# Patient Record
Sex: MALE | Race: WHITE | NOT HISPANIC OR LATINO | Employment: FULL TIME | URBAN - METROPOLITAN AREA
[De-identification: names, ages, dates, MRNs, and addresses within clinical notes are randomized per-mention and may not be internally consistent; named-entity substitution may affect disease eponyms.]

---

## 2017-01-20 ENCOUNTER — LAB VISIT (OUTPATIENT)
Dept: LAB | Facility: HOSPITAL | Age: 57
End: 2017-01-20
Attending: PODIATRIST
Payer: COMMERCIAL

## 2017-01-20 DIAGNOSIS — G47.33 OBSTRUCTIVE SLEEP APNEA (ADULT) (PEDIATRIC): ICD-10-CM

## 2017-01-20 DIAGNOSIS — E78.2 MIXED HYPERLIPIDEMIA: ICD-10-CM

## 2017-01-20 DIAGNOSIS — M53.9 OTHER UNSPECIFIED BACK DISORDER: ICD-10-CM

## 2017-01-20 DIAGNOSIS — B20 HUMAN IMMUNODEFICIENCY VIRUS (HIV) DISEASE: ICD-10-CM

## 2017-01-20 DIAGNOSIS — K76.89 OTHER CHRONIC NONALCOHOLIC LIVER DISEASE: ICD-10-CM

## 2017-01-20 LAB
ALBUMIN SERPL BCP-MCNC: 3.9 G/DL
ALP SERPL-CCNC: 67 U/L
ALT SERPL W/O P-5'-P-CCNC: 63 U/L
ANION GAP SERPL CALC-SCNC: 9 MMOL/L
AST SERPL-CCNC: 45 U/L
BASOPHILS # BLD AUTO: 0.03 K/UL
BASOPHILS NFR BLD: 0.5 %
BILIRUB SERPL-MCNC: 1 MG/DL
BUN SERPL-MCNC: 13 MG/DL
CALCIUM SERPL-MCNC: 9.4 MG/DL
CHLORIDE SERPL-SCNC: 106 MMOL/L
CHOLEST/HDLC SERPL: 3.2 {RATIO}
CO2 SERPL-SCNC: 27 MMOL/L
CREAT SERPL-MCNC: 1.2 MG/DL
DIFFERENTIAL METHOD: ABNORMAL
EOSINOPHIL # BLD AUTO: 0.2 K/UL
EOSINOPHIL NFR BLD: 2.6 %
ERYTHROCYTE [DISTWIDTH] IN BLOOD BY AUTOMATED COUNT: 14 %
EST. GFR  (AFRICAN AMERICAN): >60 ML/MIN/1.73 M^2
EST. GFR  (NON AFRICAN AMERICAN): >60 ML/MIN/1.73 M^2
GLUCOSE SERPL-MCNC: 112 MG/DL
HCT VFR BLD AUTO: 45.2 %
HDL/CHOLESTEROL RATIO: 30.9 %
HDLC SERPL-MCNC: 194 MG/DL
HDLC SERPL-MCNC: 60 MG/DL
HGB BLD-MCNC: 15.5 G/DL
LDLC SERPL CALC-MCNC: 106.4 MG/DL
LYMPHOCYTES # BLD AUTO: 2.3 K/UL
LYMPHOCYTES NFR BLD: 37.8 %
MCH RBC QN AUTO: 33.5 PG
MCHC RBC AUTO-ENTMCNC: 34.3 %
MCV RBC AUTO: 98 FL
MONOCYTES # BLD AUTO: 0.7 K/UL
MONOCYTES NFR BLD: 11.5 %
NEUTROPHILS # BLD AUTO: 2.9 K/UL
NEUTROPHILS NFR BLD: 47.3 %
NONHDLC SERPL-MCNC: 134 MG/DL
PLATELET # BLD AUTO: 258 K/UL
PMV BLD AUTO: 9.2 FL
POTASSIUM SERPL-SCNC: 4.4 MMOL/L
PROT SERPL-MCNC: 7.7 G/DL
RBC # BLD AUTO: 4.62 M/UL
SODIUM SERPL-SCNC: 142 MMOL/L
TRIGL SERPL-MCNC: 138 MG/DL
WBC # BLD AUTO: 6.11 K/UL

## 2017-01-20 PROCEDURE — 80053 COMPREHEN METABOLIC PANEL: CPT

## 2017-01-20 PROCEDURE — 36415 COLL VENOUS BLD VENIPUNCTURE: CPT | Mod: PO

## 2017-01-20 PROCEDURE — 87536 HIV-1 QUANT&REVRSE TRNSCRPJ: CPT

## 2017-01-20 PROCEDURE — 80061 LIPID PANEL: CPT

## 2017-01-20 PROCEDURE — 86592 SYPHILIS TEST NON-TREP QUAL: CPT

## 2017-01-20 PROCEDURE — 85025 COMPLETE CBC W/AUTO DIFF WBC: CPT

## 2017-01-21 LAB — RPR SER QL: NORMAL

## 2017-01-23 LAB
HIV UQ DATE RECEIVED: ABNORMAL
HIV UQ DATE REPORTED: ABNORMAL
HIV1 RNA # SERPL NAA+PROBE: <40 COPIES/ML
HIV1 RNA SERPL NAA+PROBE-LOG#: <1.6 LOG (10) COPIES/ML
HIV1 RNA SERPL QL NAA+PROBE: DETECTED

## 2017-08-07 DIAGNOSIS — K76.89 OTHER CHRONIC NONALCOHOLIC LIVER DISEASE: Primary | ICD-10-CM

## 2017-08-12 ENCOUNTER — HOSPITAL ENCOUNTER (OUTPATIENT)
Dept: RADIOLOGY | Facility: OTHER | Age: 57
Discharge: HOME | End: 2017-08-12
Attending: INTERNAL MEDICINE
Payer: COMMERCIAL

## 2017-08-12 DIAGNOSIS — K76.89 OTHER CHRONIC NONALCOHOLIC LIVER DISEASE: ICD-10-CM

## 2017-08-12 PROCEDURE — 76700 US EXAM ABDOM COMPLETE: CPT | Mod: TC

## 2017-08-12 PROCEDURE — 76700 US EXAM ABDOM COMPLETE: CPT | Mod: 26,,, | Performed by: RADIOLOGY

## 2017-09-01 ENCOUNTER — LAB VISIT (OUTPATIENT)
Dept: LAB | Facility: HOSPITAL | Age: 57
End: 2017-09-01
Attending: INTERNAL MEDICINE
Payer: COMMERCIAL

## 2017-09-01 DIAGNOSIS — B20 HUMAN IMMUNODEFICIENCY VIRUS (HIV) DISEASE: ICD-10-CM

## 2017-09-01 DIAGNOSIS — E78.2 MIXED HYPERLIPIDEMIA: ICD-10-CM

## 2017-09-01 DIAGNOSIS — G47.33 OBSTRUCTIVE SLEEP APNEA (ADULT) (PEDIATRIC): ICD-10-CM

## 2017-09-01 DIAGNOSIS — Z79.899 ENCOUNTER FOR LONG-TERM (CURRENT) USE OF OTHER MEDICATIONS: ICD-10-CM

## 2017-09-01 DIAGNOSIS — K76.89 OTHER CHRONIC NONALCOHOLIC LIVER DISEASE: ICD-10-CM

## 2017-09-01 DIAGNOSIS — B20 HUMAN IMMUNODEFICIENCY VIRUS (HIV) DISEASE: Primary | ICD-10-CM

## 2017-09-01 LAB
ALBUMIN SERPL BCP-MCNC: 3.8 G/DL
ALP SERPL-CCNC: 68 U/L
ALT SERPL W/O P-5'-P-CCNC: 63 U/L
ANION GAP SERPL CALC-SCNC: 8 MMOL/L
AST SERPL-CCNC: 41 U/L
BILIRUB SERPL-MCNC: 0.6 MG/DL
BUN SERPL-MCNC: 10 MG/DL
CALCIUM SERPL-MCNC: 9.7 MG/DL
CHLORIDE SERPL-SCNC: 106 MMOL/L
CO2 SERPL-SCNC: 28 MMOL/L
CREAT SERPL-MCNC: 1.2 MG/DL
EST. GFR  (AFRICAN AMERICAN): >60 ML/MIN/1.73 M^2
EST. GFR  (NON AFRICAN AMERICAN): >60 ML/MIN/1.73 M^2
ESTIMATED AVG GLUCOSE: 128 MG/DL
GLUCOSE SERPL-MCNC: 111 MG/DL
HBA1C MFR BLD HPLC: 6.1 %
POTASSIUM SERPL-SCNC: 4.5 MMOL/L
PROT SERPL-MCNC: 7.8 G/DL
SODIUM SERPL-SCNC: 142 MMOL/L

## 2017-09-01 PROCEDURE — 83036 HEMOGLOBIN GLYCOSYLATED A1C: CPT

## 2017-09-01 PROCEDURE — 36415 COLL VENOUS BLD VENIPUNCTURE: CPT | Mod: PO

## 2017-09-01 PROCEDURE — 80053 COMPREHEN METABOLIC PANEL: CPT

## 2018-01-05 DIAGNOSIS — G47.33 OBSTRUCTIVE SLEEP APNEA SYNDROME: ICD-10-CM

## 2018-01-05 DIAGNOSIS — B20 HUMAN IMMUNODEFICIENCY VIRUS I INFECTION: Primary | ICD-10-CM

## 2018-01-05 DIAGNOSIS — E78.2 MIXED HYPERLIPIDEMIA: ICD-10-CM

## 2018-01-05 DIAGNOSIS — Z79.899 NEED FOR PROPHYLACTIC CHEMOTHERAPY: ICD-10-CM

## 2018-03-02 ENCOUNTER — LAB VISIT (OUTPATIENT)
Dept: LAB | Facility: HOSPITAL | Age: 58
End: 2018-03-02
Attending: INTERNAL MEDICINE
Payer: COMMERCIAL

## 2018-03-02 DIAGNOSIS — Z79.899 NEED FOR PROPHYLACTIC CHEMOTHERAPY: ICD-10-CM

## 2018-03-02 DIAGNOSIS — G47.33 OBSTRUCTIVE SLEEP APNEA (ADULT) (PEDIATRIC): ICD-10-CM

## 2018-03-02 DIAGNOSIS — B20 HUMAN IMMUNODEFICIENCY VIRUS (HIV) DISEASE: ICD-10-CM

## 2018-03-02 DIAGNOSIS — B20 HUMAN IMMUNODEFICIENCY VIRUS (HIV) DISEASE: Primary | ICD-10-CM

## 2018-03-02 DIAGNOSIS — E78.2 MIXED HYPERLIPIDEMIA: ICD-10-CM

## 2018-03-02 DIAGNOSIS — M53.9 DORSOPATHY: ICD-10-CM

## 2018-03-02 LAB
ALBUMIN SERPL BCP-MCNC: 4.3 G/DL
ALP SERPL-CCNC: 58 U/L
ALT SERPL W/O P-5'-P-CCNC: 54 U/L
ANION GAP SERPL CALC-SCNC: 7 MMOL/L
AST SERPL-CCNC: 36 U/L
BILIRUB SERPL-MCNC: 1.2 MG/DL
BUN SERPL-MCNC: 13 MG/DL
CALCIUM SERPL-MCNC: 10.1 MG/DL
CHLORIDE SERPL-SCNC: 105 MMOL/L
CO2 SERPL-SCNC: 27 MMOL/L
CREAT SERPL-MCNC: 1.2 MG/DL
EST. GFR  (AFRICAN AMERICAN): >60 ML/MIN/1.73 M^2
EST. GFR  (NON AFRICAN AMERICAN): >60 ML/MIN/1.73 M^2
GLUCOSE SERPL-MCNC: 107 MG/DL
POTASSIUM SERPL-SCNC: 4.5 MMOL/L
PROT SERPL-MCNC: 8 G/DL
SODIUM SERPL-SCNC: 139 MMOL/L

## 2018-03-02 PROCEDURE — 36415 COLL VENOUS BLD VENIPUNCTURE: CPT | Mod: PO

## 2018-03-02 PROCEDURE — 80053 COMPREHEN METABOLIC PANEL: CPT

## 2018-04-06 ENCOUNTER — LAB VISIT (OUTPATIENT)
Dept: LAB | Facility: HOSPITAL | Age: 58
End: 2018-04-06
Attending: INTERNAL MEDICINE
Payer: COMMERCIAL

## 2018-04-06 DIAGNOSIS — M53.9 DORSOPATHY: ICD-10-CM

## 2018-04-06 DIAGNOSIS — Z79.899 NEED FOR PROPHYLACTIC CHEMOTHERAPY: ICD-10-CM

## 2018-04-06 DIAGNOSIS — B20 HUMAN IMMUNODEFICIENCY VIRUS (HIV) DISEASE: ICD-10-CM

## 2018-04-06 DIAGNOSIS — E78.2 MIXED HYPERLIPIDEMIA: ICD-10-CM

## 2018-04-06 DIAGNOSIS — E78.2 MIXED HYPERLIPIDEMIA: Primary | ICD-10-CM

## 2018-04-06 LAB
ALBUMIN SERPL BCP-MCNC: 4.1 G/DL
ALP SERPL-CCNC: 71 U/L
ALT SERPL W/O P-5'-P-CCNC: 47 U/L
ANION GAP SERPL CALC-SCNC: 7 MMOL/L
AST SERPL-CCNC: 34 U/L
BILIRUB SERPL-MCNC: 1 MG/DL
BUN SERPL-MCNC: 14 MG/DL
CALCIUM SERPL-MCNC: 9.9 MG/DL
CHLORIDE SERPL-SCNC: 105 MMOL/L
CO2 SERPL-SCNC: 28 MMOL/L
CREAT SERPL-MCNC: 1.2 MG/DL
EST. GFR  (AFRICAN AMERICAN): >60 ML/MIN/1.73 M^2
EST. GFR  (NON AFRICAN AMERICAN): >60 ML/MIN/1.73 M^2
GLUCOSE SERPL-MCNC: 121 MG/DL
POTASSIUM SERPL-SCNC: 4.4 MMOL/L
PROT SERPL-MCNC: 7.7 G/DL
SODIUM SERPL-SCNC: 140 MMOL/L

## 2018-04-06 PROCEDURE — 80053 COMPREHEN METABOLIC PANEL: CPT

## 2018-04-06 PROCEDURE — 36415 COLL VENOUS BLD VENIPUNCTURE: CPT | Mod: PO

## 2018-05-11 DIAGNOSIS — B20 HUMAN IMMUNODEFICIENCY VIRUS (HIV) DISEASE: Primary | ICD-10-CM

## 2018-05-11 DIAGNOSIS — M53.9 DORSOPATHY: ICD-10-CM

## 2018-05-11 DIAGNOSIS — E78.2 MIXED HYPERLIPIDEMIA: ICD-10-CM

## 2018-05-11 DIAGNOSIS — Z79.899 NEED FOR PROPHYLACTIC CHEMOTHERAPY: ICD-10-CM

## 2018-06-08 ENCOUNTER — LAB VISIT (OUTPATIENT)
Dept: LAB | Facility: HOSPITAL | Age: 58
End: 2018-06-08
Attending: INTERNAL MEDICINE
Payer: COMMERCIAL

## 2018-06-08 DIAGNOSIS — Z79.899 NEED FOR PROPHYLACTIC CHEMOTHERAPY: ICD-10-CM

## 2018-06-08 DIAGNOSIS — M53.9 DORSOPATHY: ICD-10-CM

## 2018-06-08 DIAGNOSIS — B20 HUMAN IMMUNODEFICIENCY VIRUS (HIV) DISEASE: ICD-10-CM

## 2018-06-08 DIAGNOSIS — G47.33 OBSTRUCTIVE SLEEP APNEA (ADULT) (PEDIATRIC): ICD-10-CM

## 2018-06-08 DIAGNOSIS — E78.2 MIXED HYPERLIPIDEMIA: ICD-10-CM

## 2018-06-08 LAB
ALBUMIN SERPL BCP-MCNC: 4 G/DL
ALP SERPL-CCNC: 69 U/L
ALT SERPL W/O P-5'-P-CCNC: 41 U/L
ANION GAP SERPL CALC-SCNC: 9 MMOL/L
AST SERPL-CCNC: 29 U/L
BILIRUB SERPL-MCNC: 1.1 MG/DL
BUN SERPL-MCNC: 13 MG/DL
CALCIUM SERPL-MCNC: 9.8 MG/DL
CHLORIDE SERPL-SCNC: 107 MMOL/L
CHOLEST SERPL-MCNC: 126 MG/DL
CHOLEST/HDLC SERPL: 2.9 {RATIO}
CO2 SERPL-SCNC: 26 MMOL/L
CREAT SERPL-MCNC: 1.2 MG/DL
EST. GFR  (AFRICAN AMERICAN): >60 ML/MIN/1.73 M^2
EST. GFR  (NON AFRICAN AMERICAN): >60 ML/MIN/1.73 M^2
ESTIMATED AVG GLUCOSE: 128 MG/DL
GLUCOSE SERPL-MCNC: 107 MG/DL
HBA1C MFR BLD HPLC: 6.1 %
HDLC SERPL-MCNC: 44 MG/DL
HDLC SERPL: 34.9 %
LDLC SERPL CALC-MCNC: 71.2 MG/DL
NONHDLC SERPL-MCNC: 82 MG/DL
POTASSIUM SERPL-SCNC: 4.3 MMOL/L
PROT SERPL-MCNC: 7.5 G/DL
SODIUM SERPL-SCNC: 142 MMOL/L
TRIGL SERPL-MCNC: 54 MG/DL

## 2018-06-08 PROCEDURE — 80053 COMPREHEN METABOLIC PANEL: CPT

## 2018-06-08 PROCEDURE — 87536 HIV-1 QUANT&REVRSE TRNSCRPJ: CPT

## 2018-06-08 PROCEDURE — 80061 LIPID PANEL: CPT

## 2018-06-08 PROCEDURE — 86592 SYPHILIS TEST NON-TREP QUAL: CPT

## 2018-06-08 PROCEDURE — 36415 COLL VENOUS BLD VENIPUNCTURE: CPT | Mod: PO

## 2018-06-08 PROCEDURE — 83036 HEMOGLOBIN GLYCOSYLATED A1C: CPT

## 2018-06-09 LAB — RPR SER QL: NORMAL

## 2018-07-27 ENCOUNTER — LAB VISIT (OUTPATIENT)
Dept: LAB | Facility: HOSPITAL | Age: 58
End: 2018-07-27
Attending: INTERNAL MEDICINE
Payer: COMMERCIAL

## 2018-07-27 DIAGNOSIS — B20 HUMAN IMMUNODEFICIENCY VIRUS (HIV) DISEASE: Primary | ICD-10-CM

## 2018-07-27 PROCEDURE — 36415 COLL VENOUS BLD VENIPUNCTURE: CPT | Mod: PO

## 2018-07-27 PROCEDURE — 87536 HIV-1 QUANT&REVRSE TRNSCRPJ: CPT

## 2018-07-30 LAB
HIV UQ DATE RECEIVED: NORMAL
HIV UQ DATE REPORTED: NORMAL
HIV1 RNA # SERPL NAA+PROBE: <40 COPIES/ML
HIV1 RNA SERPL NAA+PROBE-LOG#: <1.6 LOG (10) COPIES/ML
HIV1 RNA SERPL QL NAA+PROBE: NOT DETECTED

## 2018-12-07 ENCOUNTER — OFFICE VISIT (OUTPATIENT)
Dept: INFECTIOUS DISEASES | Facility: CLINIC | Age: 58
End: 2018-12-07
Payer: COMMERCIAL

## 2018-12-07 VITALS
OXYGEN SATURATION: 98 % | DIASTOLIC BLOOD PRESSURE: 100 MMHG | SYSTOLIC BLOOD PRESSURE: 140 MMHG | BODY MASS INDEX: 26.68 KG/M2 | TEMPERATURE: 98 F | HEIGHT: 72 IN | WEIGHT: 197 LBS | HEART RATE: 78 BPM

## 2018-12-07 DIAGNOSIS — G47.33 OSA (OBSTRUCTIVE SLEEP APNEA): ICD-10-CM

## 2018-12-07 DIAGNOSIS — Z79.899 ENCOUNTER FOR LONG-TERM (CURRENT) USE OF MEDICATIONS: ICD-10-CM

## 2018-12-07 DIAGNOSIS — M85.80 OSTEOPENIA, UNSPECIFIED LOCATION: ICD-10-CM

## 2018-12-07 DIAGNOSIS — Z21 HIV POSITIVE: Primary | ICD-10-CM

## 2018-12-07 DIAGNOSIS — Z12.5 SCREENING FOR PROSTATE CANCER: ICD-10-CM

## 2018-12-07 DIAGNOSIS — E78.00 HYPERCHOLESTEREMIA: ICD-10-CM

## 2018-12-07 DIAGNOSIS — B20 HUMAN IMMUNODEFICIENCY VIRUS (HIV) DISEASE: Primary | ICD-10-CM

## 2018-12-07 PROCEDURE — 99214 OFFICE O/P EST MOD 30 MIN: CPT | Mod: ,,, | Performed by: INTERNAL MEDICINE

## 2018-12-07 PROCEDURE — 3008F BODY MASS INDEX DOCD: CPT | Mod: ,,, | Performed by: INTERNAL MEDICINE

## 2018-12-07 RX ORDER — ROSUVASTATIN CALCIUM 10 MG/1
TABLET, COATED ORAL
Refills: 6 | COMMUNITY
Start: 2018-11-08 | End: 2018-12-10 | Stop reason: SDUPTHER

## 2018-12-07 RX ORDER — AMOXICILLIN 500 MG
2 CAPSULE ORAL DAILY
COMMUNITY

## 2018-12-07 NOTE — PROGRESS NOTES
Subjective:       Patient ID: Griffin Valenzuela is a 58 y.o. male.    Chief Complaint:: Follow-up (6 month check up )    HPI  Drinking again , same as before, 4 times a week, 4 drinks per night. Stress at work.   Visited [place] in NOvember  Did have his flu vaccine, and at least one shingrix([employer])  No visits to PCP since last  No labs prior to this visit  Had an URI last week with fever, runny eyes and nose, cough. Rested and it resolved.     Current Outpatient Medications:     EPZICOM 600-300 mg per tablet, , Disp: , Rfl: 6    fish oil-omega-3 fatty acids 300-1,000 mg capsule, Take 2 capsules by mouth once daily., Disp: , Rfl:     hydrocodone-acetaminophen 5-325mg (NORCO) 5-325 mg per tablet, Take 1 tablet by mouth every 6 (six) hours as needed for Pain., Disp: , Rfl:     REYATAZ 200 mg Cap, , Disp: , Rfl: 6    rosuvastatin (CRESTOR) 10 MG tablet, TAKE 1 ORAL TABLET ONCE A DAY, Disp: , Rfl: 6    FLUARIX QUAD 7142-1600, PF, 60 mcg (15 mcg x 4)/0.5 mL Syrg vaccine, ADM 0.5ML IM UTD, Disp: , Rfl: 0  Review of patient's allergies indicates:  No Known Allergies  Past Medical History:   Diagnosis Date    Hepatitis A     serology positive    History of MRSA infection     Recurring    HIV positive 2008    Hypercholesteremia 2011    Hyperglycemia     LFTs abnormal     Mild    Scoliosis     With occasional back pain    Sleep apnea     Non compliant with CPAP     Past Surgical History:   Procedure Laterality Date    COLONOSCOPY  08/2010    ELBOW FRACTURE SURGERY  1968    HEMORRHOID SURGERY N/A 1980    TONSILLECTOMY  1964     Social History     Socioeconomic History    Marital status: Single     Spouse name: None    Number of children: None    Years of education: None    Highest education level: None   Social Needs    Financial resource strain: None    Food insecurity - worry: None    Food insecurity - inability: None    Transportation needs - medical: None    Transportation needs - non-medical:  None   Occupational History    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  Rates diet as good.  He is not satisfied with weight.         Family History   Problem Relation Age of Onset    Osteoarthritis Mother     Stroke Father 86    Hyperlipidemia Brother         Tinnitus    Cancer Maternal Grandmother         lung    Cancer Maternal Grandfather         lung       Travel History: [place], [place] of [place], [place], [place], [place], [place], [place]  Vaccine History: yearly flu vaccine, MMR 2011, pneumovax 2008, prevnar 2014, eIPV 2011, typhoid 2011, 2014, HBV 1,2,3 and booster 2015, TdaP 2008, menactra 2017, zostavax 2017, shingrix 2018  Advanced Directive:   Safer Sex: abstinent  Bone Density: 8/2012 osteopenia, taking calcium plus D  Colonoscopy: 8/2010    Review of Systems    Constitutional: No fever, chills, sweats, fatigue, weakness, weight loss    Eyes: No change in vision, loss of vision, diplopia, photophobia. UTD eye exam    ENT: No sinus drainage, sore throat, mouth pain, or lesions. UTD dental exam    Cardiovascular: No chest pain, AGRAWAL, palpitations or pedal edema    Respiratory: No shortness of breath, AGRAWAL, cough, wheeze, sputum, pleurisy, or hemoptysis    Gastrointestinal: No abdominal pain, nausea, vomiting, diarrhea, constipation, blood in stool, or focal abd pain    Genitourinary: No dysuria, hematuria, incontinence, frequency, flank pain, nocturia, or urethritis    Musculoskeletal: No new pain, joint swelling, or injuries    Integumentary: abrasion left hand when he fell on uneven sidewalk carrying his  luggage    Neurological: No dizziness, vertigo, unusual headaches, neuropathy,    Psychiatric: No anxiety, depression, memory loss, sleep disturbance . No personal concerns about his alcohol consumption    Endocrine:  Thyroid normal    Lymphatic: No lymphadenopathy, blood loss, anemia, or malignancy    Objective:      Blood pressure (!) 140/100, pulse 78, temperature 97.5 °F (36.4 °C), temperature source Oral, height 6' (1.829 m), weight 89.4 kg (197 lb), SpO2 98 %. Body mass index is 26.72 kg/m².  Physical Exam      General: Alert and attentive, cooperative and in no distress    Eyes: Pupils equal, round, reactive to light, anicteric, EOMI    Neck: Supple, non-tender, no thyromegaly or masses    ENT: EAC patent, soft cerumen,  nares patent, no oral lesions, teeth in good condition, no thrush    Cardiovascular: Regular rate and rhythm, no murmurs, rubs, or gallop    Respiratory: Lungs clear without wheezes, no rales, rub or rhonci    Gastrointestinal: Active bowel sounds, soft, no mass or organomegaly, no tenderness or distention    Genitourinary: No  flank tenderness    Vascular: No peripheral edema, phlebitis, pulses normal. Warm and well perfused    Musculoskeletal: Ambulates without difficulty, no acute arthritis, synovitis or myositis. Normal muscle bulk and strength    Integumentary: healing abrasion left hand    AnusRectum:     Neurological: Normal LOC, cranial nerves, speech, reflexes, normal gait    Psychiatric: Normal mood, speech, demeanor    Lymphatic: No cervical, supraclavicular, axillary, or inguinal lymphadenopathy      Wound:     HIV Table:   HLA-B 5701  negative   TOXOIgG     RPR 6/8/2018 non reactive   HEP A IgG 2/5/2013 pos, vaccinated   HBsAg     HBsAb 2/5/2016 positive, vax + booster   HBcAb     HBeAb     HBeAg     HCVAb 1/5/2018 negative   HBV DNA     HCV RNA     Vitamin D 1/5/2018 normal,32   Chlamydia / GC     TB Gold     IPPD yearly negative 2017      Recent Diagnostics: Reviewed lab up and  through July 2018 at which time his viral load was undetectable       Assessment and Plan:           HIV positive  -     CBC auto differential; Future; Expected date: 12/07/2018  -     Comprehensive metabolic panel; Future; Expected date: 12/07/2018  -     Lipid panel; Future; Expected date: 12/07/2018  -     Hemoglobin A1c; Future; Expected date: 12/07/2018  -     HIV RNA, quantitative, PCR; Future; Expected date: 12/07/2018  -     Quantiferon Gold TB; Future; Expected date: 12/07/2018  -     Urinalysis; Future; Expected date: 12/07/2018    Screening for prostate cancer  -     PSA, Screening; Future; Expected date: 12/07/2018    Osteopenia, unspecified location  -     DXA Bone Density Spine And Hip; Future; Expected date: 12/07/2018  -     Vitamin D; Future; Expected date: 12/07/2018    Hypercholesteremia    Encounter for long-term (current) use of medications    SHELBIE (obstructive sleep apnea)      Please send me your blood pressure measurements weekly for a while, same fax number [phone]    Please go to the pharmacy and receive your second shingrix vaccine (and ask the pharmacist for a copy of the vaccine administration)    Your lab slip will be in the [facility] system CBC CMP VL, TB gold, PSA, lipids, vit D    Please educe alcohol to 2 or less per day    Return in 6 months    Needs bone density again also, gigi    This note was created using Dragon voice recognition software that occasionally misinterpreted phrases or words.

## 2018-12-07 NOTE — PATIENT INSTRUCTIONS
Please send me your blood pressure measurements weekly for a while, same fax number [phone]    Please go to the pharmacy and receive your second shingrix vaccine (and ask the pharmacist for a copy of the vaccine administration)    Your lab slip will be in the [facility] CBC CMP VL, TB gold, PSA, lipids, vit D    Please educe alcohol to 2 or less per day    Return in 6 months    Needs bone density again also, gigi

## 2018-12-10 DIAGNOSIS — E78.00 HYPERCHOLESTEREMIA: Primary | ICD-10-CM

## 2018-12-10 DIAGNOSIS — Z21 HIV POSITIVE: ICD-10-CM

## 2018-12-10 RX ORDER — ROSUVASTATIN CALCIUM 10 MG/1
10 TABLET, COATED ORAL NIGHTLY
Qty: 30 TABLET | Refills: 6 | Status: CANCELLED | OUTPATIENT
Start: 2018-12-10 | End: 2019-07-08

## 2018-12-10 RX ORDER — ROSUVASTATIN CALCIUM 10 MG/1
TABLET, COATED ORAL
Qty: 30 TABLET | Refills: 6 | Status: SHIPPED | OUTPATIENT
Start: 2018-12-10 | End: 2019-04-01 | Stop reason: SDUPTHER

## 2018-12-10 RX ORDER — ATAZANAVIR 200 MG/1
200 CAPSULE ORAL
Qty: 30 CAPSULE | Refills: 6 | Status: SHIPPED | OUTPATIENT
Start: 2018-12-10 | End: 2019-01-30

## 2018-12-10 RX ORDER — ABACAVIR AND LAMIVUDINE 600; 300 MG/1; MG/1
1 TABLET, FILM COATED ORAL DAILY
Qty: 30 TABLET | Refills: 6 | Status: CANCELLED | OUTPATIENT
Start: 2018-12-10

## 2018-12-10 RX ORDER — ATAZANAVIR 200 MG/1
200 CAPSULE ORAL
Qty: 30 CAPSULE | Refills: 6 | Status: CANCELLED | OUTPATIENT
Start: 2018-12-10

## 2018-12-10 RX ORDER — ABACAVIR AND LAMIVUDINE 600; 300 MG/1; MG/1
1 TABLET, FILM COATED ORAL DAILY
Qty: 30 TABLET | Refills: 6 | Status: SHIPPED | OUTPATIENT
Start: 2018-12-10 | End: 2019-04-01 | Stop reason: SDUPTHER

## 2018-12-14 ENCOUNTER — LAB VISIT (OUTPATIENT)
Dept: LAB | Facility: HOSPITAL | Age: 58
End: 2018-12-14
Attending: INTERNAL MEDICINE
Payer: COMMERCIAL

## 2018-12-14 DIAGNOSIS — Z21 HIV POSITIVE: ICD-10-CM

## 2018-12-14 DIAGNOSIS — M85.80 OSTEOPENIA, UNSPECIFIED LOCATION: ICD-10-CM

## 2018-12-14 DIAGNOSIS — Z12.5 SCREENING FOR PROSTATE CANCER: ICD-10-CM

## 2018-12-14 LAB
25(OH)D3+25(OH)D2 SERPL-MCNC: 33 NG/ML
ALBUMIN SERPL BCP-MCNC: 4.2 G/DL
ALP SERPL-CCNC: 77 U/L
ALT SERPL W/O P-5'-P-CCNC: 48 U/L
ANION GAP SERPL CALC-SCNC: 12 MMOL/L
AST SERPL-CCNC: 36 U/L
BASOPHILS # BLD AUTO: 0.04 K/UL
BASOPHILS NFR BLD: 0.7 %
BILIRUB SERPL-MCNC: 1.1 MG/DL
BUN SERPL-MCNC: 12 MG/DL
CALCIUM SERPL-MCNC: 10.4 MG/DL
CHLORIDE SERPL-SCNC: 105 MMOL/L
CHOLEST SERPL-MCNC: 214 MG/DL
CHOLEST/HDLC SERPL: 3.5 {RATIO}
CO2 SERPL-SCNC: 26 MMOL/L
COMPLEXED PSA SERPL-MCNC: 2.1 NG/ML
CREAT SERPL-MCNC: 1 MG/DL
DIFFERENTIAL METHOD: ABNORMAL
EOSINOPHIL # BLD AUTO: 0.2 K/UL
EOSINOPHIL NFR BLD: 3.1 %
ERYTHROCYTE [DISTWIDTH] IN BLOOD BY AUTOMATED COUNT: 13.4 %
EST. GFR  (AFRICAN AMERICAN): >60 ML/MIN/1.73 M^2
EST. GFR  (NON AFRICAN AMERICAN): >60 ML/MIN/1.73 M^2
ESTIMATED AVG GLUCOSE: 126 MG/DL
GLUCOSE SERPL-MCNC: 121 MG/DL
HBA1C MFR BLD HPLC: 6 %
HCT VFR BLD AUTO: 46.3 %
HDLC SERPL-MCNC: 62 MG/DL
HDLC SERPL: 29 %
HGB BLD-MCNC: 15.7 G/DL
IMM GRANULOCYTES # BLD AUTO: 0.02 K/UL
IMM GRANULOCYTES NFR BLD AUTO: 0.3 %
LDLC SERPL CALC-MCNC: 128 MG/DL
LYMPHOCYTES # BLD AUTO: 2.3 K/UL
LYMPHOCYTES NFR BLD: 37.8 %
MCH RBC QN AUTO: 32.5 PG
MCHC RBC AUTO-ENTMCNC: 33.9 G/DL
MCV RBC AUTO: 96 FL
MONOCYTES # BLD AUTO: 0.7 K/UL
MONOCYTES NFR BLD: 11 %
NEUTROPHILS # BLD AUTO: 2.9 K/UL
NEUTROPHILS NFR BLD: 47.1 %
NONHDLC SERPL-MCNC: 152 MG/DL
NRBC BLD-RTO: 0 /100 WBC
PLATELET # BLD AUTO: 295 K/UL
PMV BLD AUTO: 9.2 FL
POTASSIUM SERPL-SCNC: 4.3 MMOL/L
PROT SERPL-MCNC: 8.4 G/DL
RBC # BLD AUTO: 4.83 M/UL
SODIUM SERPL-SCNC: 143 MMOL/L
TRIGL SERPL-MCNC: 120 MG/DL
WBC # BLD AUTO: 6.11 K/UL

## 2018-12-14 PROCEDURE — 36415 COLL VENOUS BLD VENIPUNCTURE: CPT | Mod: PO

## 2018-12-14 PROCEDURE — 82306 VITAMIN D 25 HYDROXY: CPT

## 2018-12-14 PROCEDURE — 80053 COMPREHEN METABOLIC PANEL: CPT

## 2018-12-14 PROCEDURE — 87536 HIV-1 QUANT&REVRSE TRNSCRPJ: CPT

## 2018-12-14 PROCEDURE — 80061 LIPID PANEL: CPT

## 2018-12-14 PROCEDURE — 85025 COMPLETE CBC W/AUTO DIFF WBC: CPT

## 2018-12-14 PROCEDURE — 84153 ASSAY OF PSA TOTAL: CPT

## 2018-12-14 PROCEDURE — 83036 HEMOGLOBIN GLYCOSYLATED A1C: CPT

## 2018-12-24 ENCOUNTER — TELEPHONE (OUTPATIENT)
Dept: INFECTIOUS DISEASES | Facility: CLINIC | Age: 58
End: 2018-12-24

## 2018-12-24 NOTE — TELEPHONE ENCOUNTER
Called him on Friday as well, but he was in a meeting at work so I did not leave a message.  LM on his cell phone regarding lab results.

## 2018-12-24 NOTE — TELEPHONE ENCOUNTER
----- Message from Rosa Mann MD sent at 12/20/2018  2:49 PM CST -----  Does he already know he is undetectable?

## 2018-12-28 ENCOUNTER — TELEPHONE (OUTPATIENT)
Dept: INFECTIOUS DISEASES | Facility: CLINIC | Age: 58
End: 2018-12-28

## 2019-01-11 ENCOUNTER — HOSPITAL ENCOUNTER (OUTPATIENT)
Dept: RADIOLOGY | Facility: OTHER | Age: 59
Discharge: HOME | End: 2019-01-11
Attending: INTERNAL MEDICINE
Payer: COMMERCIAL

## 2019-01-11 PROCEDURE — 77080 DXA BONE DENSITY AXIAL: CPT | Mod: TC

## 2019-01-11 PROCEDURE — 77080 DEXA BONE DENSITY SPINE HIP: ICD-10-PCS | Mod: 26,,, | Performed by: INTERNAL MEDICINE

## 2019-01-11 PROCEDURE — 77080 DXA BONE DENSITY AXIAL: CPT | Mod: 26,,, | Performed by: INTERNAL MEDICINE

## 2019-01-29 ENCOUNTER — PATIENT OUTREACH (OUTPATIENT)
Dept: ADMINISTRATIVE | Facility: HOSPITAL | Age: 59
End: 2019-01-29

## 2019-01-29 NOTE — PROGRESS NOTES
Dear Griffin Valenzuela      Here at [facility] we care about you. After careful review it appears that you are in need of the following immunizations.....    Health Maintenance Due   Topic Date Due    TETANUS VACCINE  01/29/1978    Pneumococcal Vaccine (Highest Risk) (2 of 3 - PPSV23) 11/10/2014    Influenza Vaccine  08/01/2018    Zoster Vaccine  01/29/2019             Please feel free to contact your primary care physician  Dwayne Grace Jr, MD office to schedule a nurse visit or can visit any one  of our pharmacy locations to receive your vaccinations. You can also receive your vaccination at your local pharmacy. If you receive your vaccination at your local pharmacy we ask  that you please provide your Dwayne Grace Jr, MD office with this information to update your medical record.    If you have had any of the above done at another facility, please let us know by calling [phone]; so that we can update your record.  We will add these results to your chart if you fax them to,[phone].    Thanks,             Additional Information  If you have questions, you can email [email or web address] or call [phone]  to talk to our [facility] staff. Remember, [facility] is NOT to be used for urgent needs. For medical emergencies, dial 911.

## 2019-01-30 ENCOUNTER — OFFICE VISIT (OUTPATIENT)
Dept: FAMILY MEDICINE | Facility: CLINIC | Age: 59
End: 2019-01-30
Payer: COMMERCIAL

## 2019-01-30 VITALS
TEMPERATURE: 100 F | HEIGHT: 72 IN | HEART RATE: 107 BPM | WEIGHT: 202.5 LBS | BODY MASS INDEX: 27.43 KG/M2 | DIASTOLIC BLOOD PRESSURE: 90 MMHG | SYSTOLIC BLOOD PRESSURE: 130 MMHG | OXYGEN SATURATION: 96 %

## 2019-01-30 DIAGNOSIS — R05.9 COUGH: ICD-10-CM

## 2019-01-30 DIAGNOSIS — J06.9 BACTERIAL UPPER RESPIRATORY INFECTION: Primary | ICD-10-CM

## 2019-01-30 DIAGNOSIS — R03.0 ELEVATED BLOOD PRESSURE READING IN OFFICE WITHOUT DIAGNOSIS OF HYPERTENSION: ICD-10-CM

## 2019-01-30 DIAGNOSIS — B96.89 BACTERIAL UPPER RESPIRATORY INFECTION: Primary | ICD-10-CM

## 2019-01-30 DIAGNOSIS — Z21 HIV POSITIVE: ICD-10-CM

## 2019-01-30 PROCEDURE — 99214 PR OFFICE/OUTPT VISIT, EST, LEVL IV, 30-39 MIN: ICD-10-PCS | Mod: S$GLB,,, | Performed by: NURSE PRACTITIONER

## 2019-01-30 PROCEDURE — 99999 PR PBB SHADOW E&M-EST. PATIENT-LVL IV: CPT | Mod: PBBFAC,,, | Performed by: NURSE PRACTITIONER

## 2019-01-30 PROCEDURE — 99214 OFFICE O/P EST MOD 30 MIN: CPT | Mod: S$GLB,,, | Performed by: NURSE PRACTITIONER

## 2019-01-30 PROCEDURE — 99999 PR PBB SHADOW E&M-EST. PATIENT-LVL IV: ICD-10-PCS | Mod: PBBFAC,,, | Performed by: NURSE PRACTITIONER

## 2019-01-30 PROCEDURE — 3008F BODY MASS INDEX DOCD: CPT | Mod: CPTII,S$GLB,, | Performed by: NURSE PRACTITIONER

## 2019-01-30 PROCEDURE — 3008F PR BODY MASS INDEX (BMI) DOCUMENTED: ICD-10-PCS | Mod: CPTII,S$GLB,, | Performed by: NURSE PRACTITIONER

## 2019-01-30 RX ORDER — FERROUS SULFATE, DRIED 160(50) MG
1 TABLET, EXTENDED RELEASE ORAL DAILY
COMMUNITY

## 2019-01-30 RX ORDER — CETIRIZINE HYDROCHLORIDE 10 MG/1
10 TABLET ORAL DAILY
Refills: 0 | COMMUNITY
Start: 2019-01-30 | End: 2019-06-14

## 2019-01-30 RX ORDER — ZOSTER VACCINE RECOMBINANT, ADJUVANTED 50 MCG/0.5
KIT INTRAMUSCULAR
Refills: 0 | COMMUNITY
Start: 2019-01-05 | End: 2021-06-24 | Stop reason: CLARIF

## 2019-01-30 RX ORDER — AMOXICILLIN AND CLAVULANATE POTASSIUM 875; 125 MG/1; MG/1
1 TABLET, FILM COATED ORAL EVERY 12 HOURS
Qty: 14 TABLET | Refills: 0 | Status: SHIPPED | OUTPATIENT
Start: 2019-01-30 | End: 2019-02-06

## 2019-01-30 RX ORDER — PROMETHAZINE HYDROCHLORIDE AND DEXTROMETHORPHAN HYDROBROMIDE 6.25; 15 MG/5ML; MG/5ML
5 SYRUP ORAL
Qty: 240 ML | Refills: 0 | Status: SHIPPED | OUTPATIENT
Start: 2019-01-30 | End: 2019-02-09

## 2019-01-30 NOTE — PATIENT INSTRUCTIONS
Call if:  * A high, prolonged fever (above 102 degrees)  * Symptoms that last for more than 10 days or get worse instead of better  * Trouble breathing or shortness of breath  * Pain or pressure in the chest  * Fainting or feeling like you are about to faint  * Confusion or disorientation  * Severe or persistent vomiting  * Severe pain in your face or forehead  * Hoarseness, sore throat or a cough that won't go away after 10 days

## 2019-01-30 NOTE — PROGRESS NOTES
Subjective:       Patient ID: Griffin Valenzuela is a 59 y.o. male.    Chief Complaint: URI (cough, wheezing, body aches sleepless nights.  suffered for a wk)    HPI   1 weeks. Cough, worse at night, wheezing in chest, body aches, HAs Fatigue. Has taken benadryl and Tylenol for symptoms.   Discussed elevated BP, lifestyle stressors with job  Review of Systems   Constitutional: Positive for chills, fatigue and fever (low grade today).   HENT: Positive for postnasal drip, sore throat and trouble swallowing. Negative for congestion, ear pain, sinus pressure and sinus pain.    Respiratory: Positive for cough, chest tightness, shortness of breath (especially during a coughing spell) and wheezing.    Cardiovascular: Negative for chest pain and palpitations.   Gastrointestinal: Negative for abdominal pain, diarrhea, nausea and vomiting.   Musculoskeletal:        Body aches   Neurological: Positive for headaches. Negative for dizziness and light-headedness.     HPI   1 weeks. Cough, worse at night, wheezing in chest, body aches, HAs Fatigue. Has taken benadryl and Tylenol for symptoms.   Discussed elevated BP, lifestyle stressors with job  Review of Systems   Constitutional: Positive for chills, fatigue and fever (low grade today).   HENT: Positive for postnasal drip, sore throat and trouble swallowing. Negative for congestion, ear pain, sinus pressure and sinus pain.    Respiratory: Positive for cough, chest tightness, shortness of breath (especially during a coughing spell) and wheezing.    Cardiovascular: Negative for chest pain and palpitations.   Gastrointestinal: Negative for abdominal pain, diarrhea, nausea and vomiting.   Musculoskeletal:        Body aches   Neurological: Positive for headaches. Negative for dizziness and light-headedness.       Objective:      Vitals:    01/30/19 1636   BP: (!) 130/90   Pulse: 107   Temp: 100.3 °F (37.9 °C)     Physical Exam    Assessment:       No diagnosis found.    Plan:        There are no diagnoses linked to this encounter.  No Follow-up on file.

## 2019-01-30 NOTE — PROGRESS NOTES
"Subjective:       Patient ID: Griffin Valenzuela is a 59 y.o. male.    Chief Complaint: URI (cough, wheezing, body aches sleepless nights.  suffered for a wk)    HPI   Patient presents with complaints of coughing, body aches HA and fatigue for the last week. His cough is worse at night and he feels as though he is wheezing and hears "rattling" in his chest. He does not feel like his symptoms are improving.  At home he has been taking benadryl and tylenol for his symptoms.   Discussed elevated BP, Pt states that for last few week it has been elevated but he has had some  stressors with his job but he states that he is not normally this high.     Review of Systems   Constitutional: Positive for chills, fatigue and fever (low grade today).   HENT: Positive for postnasal drip, sore throat and trouble swallowing. Negative for congestion, ear pain, sinus pressure and sinus pain.    Respiratory: Positive for cough, chest tightness, shortness of breath (especially during a coughing spell) and wheezing.    Cardiovascular: Negative for chest pain and palpitations.   Gastrointestinal: Negative for abdominal pain, diarrhea, nausea and vomiting.   Musculoskeletal:        Body aches   Neurological: Positive for headaches. Negative for dizziness and light-headedness.         Objective:      Vitals:    01/30/19 1636   BP: (!) 130/90   Pulse: 107   Temp: 100.3 °F (37.9 °C)     Physical Exam   Constitutional: He appears well-developed and well-nourished.   HENT:   Right Ear: No tenderness. Tympanic membrane is not erythematous and not bulging. A middle ear effusion is present.   Unable to assess right TM due to cerumen impaction   Cardiovascular: Normal heart sounds. Tachycardia present.   Pulmonary/Chest: Breath sounds normal. No accessory muscle usage. No tachypnea. No respiratory distress.       Pulse oximeter 96%  Assessment:       1. Bacterial upper respiratory infection    2. Cough    3. Elevated blood pressure reading in office " without diagnosis of hypertension    4. HIV positive        Plan:     Augmentin 2x per day for 7 days.  Promethazine DM nightly as needed for cough  Zyrtec daily  Tylenol and Ibuprofen for headaches and/or fever  Increase water intake    RTC or call if symptoms worsen or do not begin to improve within the week.

## 2019-02-12 ENCOUNTER — TELEPHONE (OUTPATIENT)
Dept: INFECTIOUS DISEASES | Facility: CLINIC | Age: 59
End: 2019-02-12

## 2019-02-12 NOTE — TELEPHONE ENCOUNTER
Patient was given message regarding Bone Density results.   Says he will fax over his blood pressure readings and his shngrix immunizations.

## 2019-02-12 NOTE — TELEPHONE ENCOUNTER
----- Message from Rosa Mann MD sent at 2/11/2019  5:32 PM CST -----  I apologize for the delay. His bone density is better than it was in 2012. Pleasekeep up the calcium and vitamin D

## 2019-02-14 ENCOUNTER — TELEPHONE (OUTPATIENT)
Dept: FAMILY MEDICINE | Facility: CLINIC | Age: 59
End: 2019-02-14

## 2019-02-14 NOTE — TELEPHONE ENCOUNTER
Attempted to contact the patient to notify the patient of Dr. Grace's recommendation. Patient did not answer. Left voicemail requesting a call back.

## 2019-02-14 NOTE — TELEPHONE ENCOUNTER
----- Message from Marcia Almodovar sent at 2/14/2019  9:46 AM CST -----  Contact: pt   Name of Who is Calling: BRENDA FONTAINE [[id number]]       What is the request in detail: Patient is requesting a call from staff he states he was prescribed medication for his cold/ flu and he is starting to experience same symptoms as before and needs more medication called into pharmacy  ..... Please contact to further discuss and advise.     Can the clinic reply by [facility]: no     What [facility]: [phone] ext [phone]

## 2019-02-15 ENCOUNTER — TELEPHONE (OUTPATIENT)
Dept: FAMILY MEDICINE | Facility: CLINIC | Age: 59
End: 2019-02-15

## 2019-02-15 NOTE — TELEPHONE ENCOUNTER
Spoke with the patient to inform him of Dr. Grace's message in regards to seeing LEXI or Dr. Mann. Patient states this is a primary care problem. Patient states he is relapsing. Patient states the medications he was prescribed during his last visit helped, however when he returned to work he became symptomatic.      Attempted to schedule the patient for an appt on tomorrow 02/16/19. Patient is unable to accept that appt. Patient states he will contact Dr. Mann's office to see if he can get a sooner appt. If not, he will call us back.

## 2019-02-15 NOTE — TELEPHONE ENCOUNTER
----- Message from Yoli Bobo sent at 2/15/2019  8:59 AM CST -----  Contact: lencho  Name of Who is Calling: lencho      What is the request in detail: Patient was returning a missed call back from the staff       Can the clinic reply by [facility]:yes       [facility]: [phone] ext [phone]

## 2019-02-15 NOTE — TELEPHONE ENCOUNTER
----- Message from Jennifer Alejandra sent at 2/15/2019 10:02 AM CST -----  Name of Who is Calling: BRENDA HAYWOOD [[id number]]    What is the request in detail: Pt wants gerardo to know he got an earlier appt.       Can the clinic reply by [facility]:    No       What [facility] Back [facility]: [phone]

## 2019-03-29 DIAGNOSIS — Z21 HIV POSITIVE: Primary | ICD-10-CM

## 2019-03-29 DIAGNOSIS — E78.00 HYPERCHOLESTEREMIA: ICD-10-CM

## 2019-04-01 RX ORDER — ATAZANAVIR 200 MG/1
400 CAPSULE ORAL DAILY
Qty: 60 CAPSULE | Refills: 6 | Status: SHIPPED | OUTPATIENT
Start: 2019-04-01 | End: 2019-12-20 | Stop reason: SDUPTHER

## 2019-04-01 RX ORDER — ATAZANAVIR 200 MG/1
CAPSULE ORAL
Refills: 6 | COMMUNITY
Start: 2019-02-25 | End: 2019-04-01 | Stop reason: SDUPTHER

## 2019-04-01 RX ORDER — ROSUVASTATIN CALCIUM 10 MG/1
TABLET, COATED ORAL
Qty: 30 TABLET | Refills: 6 | Status: SHIPPED | OUTPATIENT
Start: 2019-04-01 | End: 2019-12-20 | Stop reason: SDUPTHER

## 2019-04-01 RX ORDER — ABACAVIR AND LAMIVUDINE 600; 300 MG/1; MG/1
1 TABLET, FILM COATED ORAL DAILY
Qty: 30 TABLET | Refills: 6 | Status: SHIPPED | OUTPATIENT
Start: 2019-04-01 | End: 2019-10-28

## 2019-06-07 ENCOUNTER — TELEPHONE (OUTPATIENT)
Dept: INFECTIOUS DISEASES | Facility: CLINIC | Age: 59
End: 2019-06-07

## 2019-06-07 DIAGNOSIS — E78.00 HYPERCHOLESTEREMIA: ICD-10-CM

## 2019-06-07 DIAGNOSIS — B20 HUMAN IMMUNODEFICIENCY VIRUS (HIV) DISEASE: Primary | ICD-10-CM

## 2019-06-10 ENCOUNTER — LAB VISIT (OUTPATIENT)
Dept: LAB | Facility: HOSPITAL | Age: 59
End: 2019-06-10
Attending: INTERNAL MEDICINE
Payer: COMMERCIAL

## 2019-06-10 DIAGNOSIS — B20 HUMAN IMMUNODEFICIENCY VIRUS (HIV) DISEASE: ICD-10-CM

## 2019-06-10 DIAGNOSIS — E78.00 HYPERCHOLESTEREMIA: ICD-10-CM

## 2019-06-10 LAB
ALBUMIN SERPL BCP-MCNC: 4.3 G/DL (ref 3.5–5.2)
ALP SERPL-CCNC: 68 U/L (ref 55–135)
ALT SERPL W/O P-5'-P-CCNC: 39 U/L (ref 10–44)
ANION GAP SERPL CALC-SCNC: 10 MMOL/L (ref 8–16)
AST SERPL-CCNC: 34 U/L (ref 10–40)
BASOPHILS # BLD AUTO: 0.08 K/UL (ref 0–0.2)
BASOPHILS NFR BLD: 1.3 % (ref 0–1.9)
BILIRUB SERPL-MCNC: 1 MG/DL (ref 0.1–1)
BUN SERPL-MCNC: 13 MG/DL (ref 6–20)
CALCIUM SERPL-MCNC: 10.3 MG/DL (ref 8.7–10.5)
CHLORIDE SERPL-SCNC: 105 MMOL/L (ref 95–110)
CHOLEST SERPL-MCNC: 190 MG/DL (ref 120–199)
CHOLEST/HDLC SERPL: 2.9 {RATIO} (ref 2–5)
CO2 SERPL-SCNC: 27 MMOL/L (ref 23–29)
CREAT SERPL-MCNC: 1.2 MG/DL (ref 0.5–1.4)
DIFFERENTIAL METHOD: ABNORMAL
EOSINOPHIL # BLD AUTO: 0.4 K/UL (ref 0–0.5)
EOSINOPHIL NFR BLD: 6.5 % (ref 0–8)
ERYTHROCYTE [DISTWIDTH] IN BLOOD BY AUTOMATED COUNT: 14.3 % (ref 11.5–14.5)
EST. GFR  (AFRICAN AMERICAN): >60 ML/MIN/1.73 M^2
EST. GFR  (NON AFRICAN AMERICAN): >60 ML/MIN/1.73 M^2
ESTIMATED AVG GLUCOSE: 123 MG/DL (ref 68–131)
GLUCOSE SERPL-MCNC: 109 MG/DL (ref 70–110)
HBA1C MFR BLD HPLC: 5.9 % (ref 4–5.6)
HCT VFR BLD AUTO: 47.6 % (ref 40–54)
HDLC SERPL-MCNC: 66 MG/DL (ref 40–75)
HDLC SERPL: 34.7 % (ref 20–50)
HGB BLD-MCNC: 16 G/DL (ref 14–18)
IMM GRANULOCYTES # BLD AUTO: 0.02 K/UL (ref 0–0.04)
IMM GRANULOCYTES NFR BLD AUTO: 0.3 % (ref 0–0.5)
LDLC SERPL CALC-MCNC: 109.2 MG/DL (ref 63–159)
LYMPHOCYTES # BLD AUTO: 2.8 K/UL (ref 1–4.8)
LYMPHOCYTES NFR BLD: 47.1 % (ref 18–48)
MCH RBC QN AUTO: 31.7 PG (ref 27–31)
MCHC RBC AUTO-ENTMCNC: 33.6 G/DL (ref 32–36)
MCV RBC AUTO: 94 FL (ref 82–98)
MONOCYTES # BLD AUTO: 0.7 K/UL (ref 0.3–1)
MONOCYTES NFR BLD: 10.9 % (ref 4–15)
NEUTROPHILS # BLD AUTO: 2 K/UL (ref 1.8–7.7)
NEUTROPHILS NFR BLD: 33.9 % (ref 38–73)
NONHDLC SERPL-MCNC: 124 MG/DL
NRBC BLD-RTO: 0 /100 WBC
PLATELET # BLD AUTO: 262 K/UL (ref 150–350)
PMV BLD AUTO: 9.3 FL (ref 9.2–12.9)
POTASSIUM SERPL-SCNC: 4.2 MMOL/L (ref 3.5–5.1)
PROT SERPL-MCNC: 8.2 G/DL (ref 6–8.4)
RBC # BLD AUTO: 5.04 M/UL (ref 4.6–6.2)
RPR SER QL: NORMAL
SODIUM SERPL-SCNC: 142 MMOL/L (ref 136–145)
TRIGL SERPL-MCNC: 74 MG/DL (ref 30–150)
WBC # BLD AUTO: 6.03 K/UL (ref 3.9–12.7)

## 2019-06-10 PROCEDURE — 80053 COMPREHEN METABOLIC PANEL: CPT

## 2019-06-10 PROCEDURE — 87536 HIV-1 QUANT&REVRSE TRNSCRPJ: CPT

## 2019-06-10 PROCEDURE — 36415 COLL VENOUS BLD VENIPUNCTURE: CPT | Mod: PO

## 2019-06-10 PROCEDURE — 86592 SYPHILIS TEST NON-TREP QUAL: CPT

## 2019-06-10 PROCEDURE — 85025 COMPLETE CBC W/AUTO DIFF WBC: CPT

## 2019-06-10 PROCEDURE — 80061 LIPID PANEL: CPT

## 2019-06-10 PROCEDURE — 83036 HEMOGLOBIN GLYCOSYLATED A1C: CPT

## 2019-06-14 ENCOUNTER — OFFICE VISIT (OUTPATIENT)
Dept: INFECTIOUS DISEASES | Facility: CLINIC | Age: 59
End: 2019-06-14
Payer: COMMERCIAL

## 2019-06-14 VITALS
BODY MASS INDEX: 27.22 KG/M2 | HEIGHT: 72 IN | WEIGHT: 201 LBS | HEART RATE: 78 BPM | OXYGEN SATURATION: 98 % | SYSTOLIC BLOOD PRESSURE: 134 MMHG | DIASTOLIC BLOOD PRESSURE: 98 MMHG | TEMPERATURE: 97 F

## 2019-06-14 DIAGNOSIS — Z79.899 ENCOUNTER FOR LONG-TERM (CURRENT) USE OF MEDICATIONS: ICD-10-CM

## 2019-06-14 DIAGNOSIS — Z12.5 SCREENING FOR PROSTATE CANCER: ICD-10-CM

## 2019-06-14 DIAGNOSIS — G47.33 OSA (OBSTRUCTIVE SLEEP APNEA): ICD-10-CM

## 2019-06-14 DIAGNOSIS — M85.80 OSTEOPENIA, UNSPECIFIED LOCATION: ICD-10-CM

## 2019-06-14 DIAGNOSIS — K13.70 ORAL MUCOSAL LESION: ICD-10-CM

## 2019-06-14 DIAGNOSIS — E78.00 HYPERCHOLESTEREMIA: ICD-10-CM

## 2019-06-14 DIAGNOSIS — B20 HUMAN IMMUNODEFICIENCY VIRUS (HIV) DISEASE: Primary | ICD-10-CM

## 2019-06-14 PROCEDURE — 3008F BODY MASS INDEX DOCD: CPT | Mod: ,,, | Performed by: INTERNAL MEDICINE

## 2019-06-14 PROCEDURE — 99215 OFFICE O/P EST HI 40 MIN: CPT | Mod: ,,, | Performed by: INTERNAL MEDICINE

## 2019-06-14 PROCEDURE — 99215 PR OFFICE/OUTPT VISIT, EST, LEVL V, 40-54 MIN: ICD-10-PCS | Mod: ,,, | Performed by: INTERNAL MEDICINE

## 2019-06-14 PROCEDURE — 3008F PR BODY MASS INDEX (BMI) DOCUMENTED: ICD-10-PCS | Mod: ,,, | Performed by: INTERNAL MEDICINE

## 2019-06-14 NOTE — PATIENT INSTRUCTIONS
If the whitish spot on your left soft palate does not resolve in a week or two, please see an ENT physician.     Repeat the viral load in 3 months    Please be mindful of carbs and fat    Flu vaccine in November    Next lab and visit in December:  (PSA, CMP, CBC , a1c, viral load, UA and others ..)    Please get a blood pressure monitor and measure 2-3 times a week and send them to us.

## 2019-06-14 NOTE — PROGRESS NOTES
Subjective:       Patient ID: Griffin Valenzuela is a 59 y.o. male.    Chief Complaint:: 6  mos check up    HPI  12/7/19: Drinking again , same as before, 4 times a week, 4 drinks per night. Stress at work.   Visited [place] in NOvember  Did have his flu vaccine, and at least one shingrix([employer])  No visits to PCP since last  No labs prior to this visit  Had an URI last week with fever, runny eyes and nose, cough. Rested and it resolved.     6/14/19: only complaint is a flare of his back pain. He has plenty of old hydrocodone to use. Encouraged him to use NSAID as well. His BP is always high after driving from [place]. Repeat was 140/94. His outpatient blood pressures are largely 125/80 (his record sent to us, measured by nurse at work). He did receive the secodn shingles vaccine. His bone density was improved compared to 2012. He was abstinent from alcohol for 4 months and resumed drinking. Encouraged moderation, because of lipids and BS. Reviewed lab, lipids are better. VL is detected less than 40, same as last summer. Adherence is excellent    Current Outpatient Medications:     abacavir-lamivudine (EPZICOM) 600-300 mg per tablet, Take 1 tablet by mouth once daily., Disp: 30 tablet, Rfl: 6    atazanavir (REYATAZ) 200 MG Cap, Take 2 capsules (400 mg total) by mouth once daily., Disp: 60 capsule, Rfl: 6    calcium-vitamin D3 (CALCIUM 500 + D) 500 mg(1,250mg) -200 unit per tablet, Take 1 tablet by mouth 2 (two) times daily with meals., Disp: , Rfl:     fish oil-omega-3 fatty acids 300-1,000 mg capsule, Take 2 capsules by mouth once daily., Disp: , Rfl:     hydrocodone-acetaminophen 5-325mg (NORCO) 5-325 mg per tablet, Take 1 tablet by mouth every 6 (six) hours as needed for Pain., Disp: , Rfl:     rosuvastatin (CRESTOR) 10 MG tablet, TAKE 1 ORAL TABLET ONCE A DAY, Disp: 30 tablet, Rfl: 6    FLUARIX QUAD [id number], PF, 60 mcg (15 mcg x 4)/0.5 mL Syrg vaccine, ADM 0.5ML IM UTD, Disp: , Rfl: 0    SHINGRIX, PF, 50  mcg/0.5 mL injection, ADM 0.5ML IM UTD, Disp: , Rfl: 0  Review of patient's allergies indicates:  No Known Allergies  Past Medical History:   Diagnosis Date    Hepatitis A     serology positive    History of MRSA infection     Recurring    HIV positive 2008    Hypercholesteremia 2011    Hyperglycemia     LFTs abnormal     Mild    Scoliosis     With occasional back pain    Sleep apnea     Non compliant with CPAP     Past Surgical History:   Procedure Laterality Date    COLONOSCOPY  08/2010    ELBOW FRACTURE SURGERY  1968    HEMORRHOID SURGERY N/A 1980    TONSILLECTOMY  1964     Social History     Socioeconomic History    Marital status: Single     Spouse name: Not on file    Number of children: Not on file    Years of education: Not on file    Highest education level: Not on file   Occupational History    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   Social Needs    Financial resource strain: Not on file    Food insecurity:     Worry: Not on file     Inability: Not on file    Transportation needs:     Medical: Not on file     Non-medical: Not on file   Tobacco Use    Smoking status: Never Smoker    Smokeless tobacco: Never Used   Substance and Sexual Activity    Alcohol use: Yes     Alcohol/week: 6.0 oz     Types: 12 drink(s) per week    Drug use: No    Sexual activity: Not on file   Lifestyle    Physical activity:     Days per week: Not on file     Minutes per session: Not on file    Stress: Not on file   Relationships    Social connections:     Talks on phone: Not on file     Gets together: Not on file     Attends Jew service: Not on file     Active member of club or organization: Not on file     Attends meetings of clubs or organizations: Not on file     Relationship status: Not on file   Other Topics Concern    Not on file   Social History Narrative    The patient does exercise regularly (bikes 6x/week).  Rates diet as good.  He is not satisfied with  "weight.         Family History   Problem Relation Age of Onset    Osteoarthritis Mother     Stroke Father 86    Hyperlipidemia Brother         Tinnitus    Cancer Maternal Grandmother         lung    Cancer Maternal Grandfather         lung       Travel History: [place], [place], [place], [place], [place], [place]  Vaccine History: yearly flu vaccine, MMR 2011, pneumovax 2008, prevnar 2014, eIPV 2011, typhoid 2011, 2014, HBV 1,2,3 and booster 2015, TdaP 2008, menactra 2017, zostavax 2017, shingrix 2018 x2  Advanced Directive:   Safer Sex: abstinent  Bone Density: 8/2012 osteopenia, taking calcium plus D, 2018 improved  Colonoscopy: 8/2010    Review of Systems    Constitutional: No fever, chills, sweats, fatigue, weakness, weight loss    Eyes: No change in vision, loss of vision, diplopia, photophobia. UTD eye exam    ENT:  Mild allergic rhinitis. No purulent sinus drainage, sore throat, mouth pain, or lesions. UTD dental exam. Treated for URI per primary care 1/2019  Cardiovascular: No chest pain, AGRAWAL, palpitations or pedal edema. Not riding his bike as much because his office is now only 4 blocks from home    Respiratory: No shortness of breath, AGRAWAL, cough, wheeze, sputum, pleurisy, or hemoptysis    Gastrointestinal: No abdominal pain, nausea, vomiting, diarrhea, constipation, blood in stool, or focal abd pain    Genitourinary: No dysuria, hematuria, incontinence, frequency, flank pain, nocturia, or urethritis    Musculoskeletal: No new pain, joint swelling, or injuries, but  A flare of old "scoliosis" pain, from sleeping on his stomach.     Integumentary: no new skin lesions or wounds    Neurological: No dizziness, vertigo, unusual headaches, neuropathy,    Psychiatric: No anxiety, depression, memory loss, sleep disturbance . No personal concerns about his alcohol consumption but he is more attentive to the volume. D/w him research about no true beneficial amount of " alcohol    Endocrine:  Thyroid normal    Lymphatic: No lymphadenopathy, blood loss, anemia, or malignancy    Objective:      Blood pressure (!) 134/98, pulse 78, temperature 97 °F (36.1 °C), temperature source Temporal, height 6' (1.829 m), weight 91.2 kg (201 lb), SpO2 98 %. Body mass index is 27.26 kg/m².  Physical Exam      General: Alert and attentive, cooperative and in no distress    Eyes: Pupils equal, round, reactive to light, anicteric, EOMI    Neck: Supple, non-tender, no thyromegaly or masses    ENT: EAC patent, soft dry cerumen,  nares patent,  teeth in good condition, no thrush. There is a 4 mm patch of white with rough surface in the posterior left soft palate. Non tender, does not scrape off.            Cardiovascular: Regular rate and rhythm, no murmurs, rubs, or gallop    Respiratory: Lungs clear without wheezes, no rales, rub or rhonci    Gastrointestinal: Active bowel sounds, soft, no mass or organomegaly, no tenderness or distention    Genitourinary: No  flank tenderness    Vascular: No peripheral edema, phlebitis, pulses normal. Warm and well perfused    Musculoskeletal: Ambulates without difficulty, no acute arthritis, synovitis or myositis. Normal muscle bulk and strength    Integumentary: healing abrasion left hand    AnusRectum: slightly decreased tone, prostate 10g no nodules. No external lesions    Neurological: Normal LOC, cranial nerves, speech, reflexes, normal gait. Moves up and down a little more slowly today    Psychiatric: Normal mood, speech, demeanor    Lymphatic: No cervical, supraclavicular, axillary, or inguinal lymphadenopathy      Wound:     HIV Table:   HLA-B 5701  negative   TOXOIgG     RPR 6/2019 non reactive   HEP A IgG 2/5/2013 pos, vaccinated   HBsAg     HBsAb 2/5/2016 positive, vax + booster   HBcAb     HBeAb     HBeAg     HCVAb 1/5/2018 negative   HBV DNA     HCV RNA     Vitamin D 1/5/2018 normal,32   Chlamydia / GC     TB Gold  12/2018   Negative: IPPD yearly  negative 2018  PSA    12/2018  2.1      Recent Diagnostics: Reviewed lab up and through june 19 at which time his viral load was detectable but less than 40       Assessment and Plan:           Human immunodeficiency virus (HIV) disease  -     HIV RNA, quantitative, PCR; Future; Expected date: 09/14/2019    Hypercholesteremia    Screening for prostate cancer    Osteopenia, unspecified location    Encounter for long-term (current) use of medications    SHELBIE (obstructive sleep apnea)    Oral mucosal lesion      If the whitish spot on your left soft palate does not resolve in a week or two, please see an ENT physician.     Repeat the viral load in 3 months    Please be mindful of carbs and fat    Flu vaccine in November    Next lab and visit in December:  (PSA, CMP, CBC , a1c, viral load, UA and others ..)    Please get a blood pressure monitor and measure 2-3 times a week and send them to us.     This note was created using Dragon voice recognition software that occasionally misinterpreted phrases or words.

## 2019-08-14 ENCOUNTER — TELEPHONE (OUTPATIENT)
Dept: INFECTIOUS DISEASES | Facility: CLINIC | Age: 59
End: 2019-08-14

## 2019-08-14 NOTE — TELEPHONE ENCOUNTER
----- Message from Nayla Almodovar sent at 8/14/2019 10:16 AM CDT -----  Having trouble with his prescriptions. Needs new Rx for Reyataz (needs brand name not generic) and marked dispense as written. Goes to [facility]

## 2019-09-06 ENCOUNTER — LAB VISIT (OUTPATIENT)
Dept: LAB | Facility: HOSPITAL | Age: 59
End: 2019-09-06
Attending: INTERNAL MEDICINE
Payer: COMMERCIAL

## 2019-09-06 DIAGNOSIS — B20 HUMAN IMMUNODEFICIENCY VIRUS (HIV) DISEASE: ICD-10-CM

## 2019-09-06 PROCEDURE — 87536 HIV-1 QUANT&REVRSE TRNSCRPJ: CPT

## 2019-09-06 PROCEDURE — 36415 COLL VENOUS BLD VENIPUNCTURE: CPT | Mod: PO

## 2019-09-19 ENCOUNTER — TELEPHONE (OUTPATIENT)
Dept: INFECTIOUS DISEASES | Facility: CLINIC | Age: 59
End: 2019-09-19

## 2019-09-19 NOTE — TELEPHONE ENCOUNTER
----- Message from Rosa Mann MD sent at 9/19/2019 11:08 AM CDT -----  Please let him know he is back to undetectable

## 2019-12-13 ENCOUNTER — LAB VISIT (OUTPATIENT)
Dept: LAB | Facility: HOSPITAL | Age: 59
End: 2019-12-13
Attending: INTERNAL MEDICINE
Payer: COMMERCIAL

## 2019-12-13 DIAGNOSIS — Z79.899 ENCOUNTER FOR LONG-TERM (CURRENT) USE OF MEDICATIONS: ICD-10-CM

## 2019-12-13 DIAGNOSIS — B20 HUMAN IMMUNODEFICIENCY VIRUS (HIV) DISEASE: ICD-10-CM

## 2019-12-13 DIAGNOSIS — Z12.5 SCREENING FOR PROSTATE CANCER: ICD-10-CM

## 2019-12-13 DIAGNOSIS — E78.00 HYPERCHOLESTEREMIA: ICD-10-CM

## 2019-12-13 LAB
ALBUMIN SERPL BCP-MCNC: 4.1 G/DL (ref 3.5–5.2)
ALP SERPL-CCNC: 68 U/L (ref 55–135)
ALT SERPL W/O P-5'-P-CCNC: 55 U/L (ref 10–44)
ANION GAP SERPL CALC-SCNC: 8 MMOL/L (ref 8–16)
AST SERPL-CCNC: 39 U/L (ref 10–40)
BASOPHILS # BLD AUTO: 0.07 K/UL (ref 0–0.2)
BASOPHILS NFR BLD: 1.3 % (ref 0–1.9)
BILIRUB SERPL-MCNC: 1.6 MG/DL (ref 0.1–1)
BUN SERPL-MCNC: 16 MG/DL (ref 6–20)
CALCIUM SERPL-MCNC: 9.8 MG/DL (ref 8.7–10.5)
CHLORIDE SERPL-SCNC: 105 MMOL/L (ref 95–110)
CO2 SERPL-SCNC: 28 MMOL/L (ref 23–29)
COMPLEXED PSA SERPL-MCNC: 1.2 NG/ML (ref 0–4)
CREAT SERPL-MCNC: 1.2 MG/DL (ref 0.5–1.4)
DIFFERENTIAL METHOD: ABNORMAL
EOSINOPHIL # BLD AUTO: 0.2 K/UL (ref 0–0.5)
EOSINOPHIL NFR BLD: 4.3 % (ref 0–8)
ERYTHROCYTE [DISTWIDTH] IN BLOOD BY AUTOMATED COUNT: 14 % (ref 11.5–14.5)
EST. GFR  (AFRICAN AMERICAN): >60 ML/MIN/1.73 M^2
EST. GFR  (NON AFRICAN AMERICAN): >60 ML/MIN/1.73 M^2
ESTIMATED AVG GLUCOSE: 131 MG/DL (ref 68–131)
GLUCOSE SERPL-MCNC: 114 MG/DL (ref 70–110)
HBA1C MFR BLD HPLC: 6.2 % (ref 4–5.6)
HCT VFR BLD AUTO: 48.6 % (ref 40–54)
HGB BLD-MCNC: 15.6 G/DL (ref 14–18)
IMM GRANULOCYTES # BLD AUTO: 0.02 K/UL (ref 0–0.04)
IMM GRANULOCYTES NFR BLD AUTO: 0.4 % (ref 0–0.5)
LYMPHOCYTES # BLD AUTO: 2 K/UL (ref 1–4.8)
LYMPHOCYTES NFR BLD: 37.3 % (ref 18–48)
MCH RBC QN AUTO: 32.1 PG (ref 27–31)
MCHC RBC AUTO-ENTMCNC: 32.1 G/DL (ref 32–36)
MCV RBC AUTO: 100 FL (ref 82–98)
MONOCYTES # BLD AUTO: 0.5 K/UL (ref 0.3–1)
MONOCYTES NFR BLD: 8.8 % (ref 4–15)
NEUTROPHILS # BLD AUTO: 2.6 K/UL (ref 1.8–7.7)
NEUTROPHILS NFR BLD: 47.9 % (ref 38–73)
NRBC BLD-RTO: 0 /100 WBC
PLATELET # BLD AUTO: 255 K/UL (ref 150–350)
PMV BLD AUTO: 9.3 FL (ref 9.2–12.9)
POTASSIUM SERPL-SCNC: 4.5 MMOL/L (ref 3.5–5.1)
PROT SERPL-MCNC: 7.7 G/DL (ref 6–8.4)
RBC # BLD AUTO: 4.86 M/UL (ref 4.6–6.2)
SODIUM SERPL-SCNC: 141 MMOL/L (ref 136–145)
WBC # BLD AUTO: 5.34 K/UL (ref 3.9–12.7)

## 2019-12-13 PROCEDURE — 36415 COLL VENOUS BLD VENIPUNCTURE: CPT | Mod: PO

## 2019-12-13 PROCEDURE — 84153 ASSAY OF PSA TOTAL: CPT

## 2019-12-13 PROCEDURE — 85025 COMPLETE CBC W/AUTO DIFF WBC: CPT

## 2019-12-13 PROCEDURE — 87536 HIV-1 QUANT&REVRSE TRNSCRPJ: CPT

## 2019-12-13 PROCEDURE — 83036 HEMOGLOBIN GLYCOSYLATED A1C: CPT

## 2019-12-13 PROCEDURE — 80053 COMPREHEN METABOLIC PANEL: CPT

## 2019-12-20 ENCOUNTER — OFFICE VISIT (OUTPATIENT)
Dept: INFECTIOUS DISEASES | Facility: CLINIC | Age: 59
End: 2019-12-20
Payer: COMMERCIAL

## 2019-12-20 VITALS
OXYGEN SATURATION: 99 % | HEIGHT: 72 IN | SYSTOLIC BLOOD PRESSURE: 160 MMHG | BODY MASS INDEX: 27.5 KG/M2 | WEIGHT: 203 LBS | DIASTOLIC BLOOD PRESSURE: 109 MMHG | TEMPERATURE: 97 F | HEART RATE: 100 BPM

## 2019-12-20 DIAGNOSIS — Z21 HIV POSITIVE: ICD-10-CM

## 2019-12-20 DIAGNOSIS — B20 HUMAN IMMUNODEFICIENCY VIRUS (HIV) DISEASE: Primary | ICD-10-CM

## 2019-12-20 DIAGNOSIS — R73.03 PRE-DIABETES: ICD-10-CM

## 2019-12-20 DIAGNOSIS — E78.00 HYPERCHOLESTEREMIA: ICD-10-CM

## 2019-12-20 DIAGNOSIS — R79.89 ABNORMAL LIVER FUNCTION TEST: ICD-10-CM

## 2019-12-20 DIAGNOSIS — I10 HYPERTENSION, UNSPECIFIED TYPE: ICD-10-CM

## 2019-12-20 PROCEDURE — 3008F PR BODY MASS INDEX (BMI) DOCUMENTED: ICD-10-PCS | Mod: S$GLB,,, | Performed by: INTERNAL MEDICINE

## 2019-12-20 PROCEDURE — 3008F BODY MASS INDEX DOCD: CPT | Mod: S$GLB,,, | Performed by: INTERNAL MEDICINE

## 2019-12-20 PROCEDURE — 99214 PR OFFICE/OUTPT VISIT, EST, LEVL IV, 30-39 MIN: ICD-10-PCS | Mod: S$GLB,,, | Performed by: INTERNAL MEDICINE

## 2019-12-20 PROCEDURE — 99214 OFFICE O/P EST MOD 30 MIN: CPT | Mod: S$GLB,,, | Performed by: INTERNAL MEDICINE

## 2019-12-20 RX ORDER — ROSUVASTATIN CALCIUM 10 MG/1
TABLET, COATED ORAL
Qty: 30 TABLET | Refills: 6 | Status: SHIPPED | OUTPATIENT
Start: 2019-12-20 | End: 2020-06-19

## 2019-12-20 RX ORDER — ATAZANAVIR 200 MG/1
400 CAPSULE ORAL DAILY
Qty: 60 CAPSULE | Refills: 5 | Status: SHIPPED | OUTPATIENT
Start: 2019-12-20 | End: 2020-01-31 | Stop reason: SDUPTHER

## 2019-12-20 RX ORDER — LISINOPRIL 10 MG/1
10 TABLET ORAL DAILY
Qty: 30 TABLET | Refills: 11 | Status: SHIPPED | OUTPATIENT
Start: 2019-12-20 | End: 2020-06-19

## 2019-12-20 RX ORDER — HYDROCODONE BITARTRATE AND ACETAMINOPHEN 5; 325 MG/1; MG/1
1 TABLET ORAL EVERY 6 HOURS PRN
Qty: 28 TABLET | Refills: 0 | Status: SHIPPED | OUTPATIENT
Start: 2019-12-20 | End: 2020-12-17 | Stop reason: SDUPTHER

## 2019-12-20 RX ORDER — ROSUVASTATIN CALCIUM 10 MG/1
10 TABLET, COATED ORAL NIGHTLY
Qty: 30 TABLET | Refills: 11 | Status: SHIPPED | OUTPATIENT
Start: 2019-12-20 | End: 2020-06-19

## 2019-12-20 RX ORDER — ABACAVIR AND LAMIVUDINE 600; 300 MG/1; MG/1
1 TABLET, FILM COATED ORAL DAILY
Qty: 30 TABLET | Refills: 5 | Status: SHIPPED | OUTPATIENT
Start: 2019-12-20 | End: 2020-06-19

## 2019-12-20 NOTE — PROGRESS NOTES
Subjective:       Patient ID: Griffin Valenzuela is a 59 y.o. male.    Chief Complaint:: HIV Positive/AIDS    HPI  12/7/19: Drinking again , same as before, 4 times a week, 4 drinks per night. Stress at work.   Visited [place] in [place]mber  Did have his flu vaccine, and at least one shingrix([employer])  No visits to PCP since last  No labs prior to this visit  Had an URI last week with fever, runny eyes and nose, cough. Rested and it resolved.     6/14/19: only complaint is a flare of his back pain. He has plenty of old hydrocodone to use. Encouraged him to use NSAID as well. His BP is always high after driving from [place]. Repeat was 140/94. His outpatient blood pressures are largely 125/80 (his record sent to us, measured by nurse at work). He did receive the second shingles vaccine. His bone density was improved compared to 2012. He was abstinent from alcohol for 4 months and resumed drinking. Encouraged moderation, because of lipids and BS. Reviewed lab, lipids are better. VL is detected less than 40, same as last summer. Adherence is excellent.     12/20/19:  He is under a great deal of stress because he is looking for new job, [place] is extra stressful and he just had to drive here from [place].  His blood pressure is always elevated when he is here but he blames it on the stress of the drive.  He has not been measuring his blood pressure as often and today it is just too high to ignore.  He rides his bike almost every day and is very fit, has Nocardia vascular symptoms.  He is drinking more than usual because of the stress and he knows it is not healthy.  His liver enzymes are slightly elevated as is the bilirubin and his blood sugar is a bit higher is consistent with pre diabetes.  Viral load is less than 40 but detected and he reports missing his medication approximately twice per month.  He takes it in the morning with food and sometimes does not have time for breakfast.  He is also in a hurry  today.    Current Outpatient Medications:     calcium-vitamin D3 (CALCIUM 500 + D) 500 mg(1,250mg) -200 unit per tablet, Take 1 tablet by mouth 2 (two) times daily with meals., Disp: , Rfl:     fish oil-omega-3 fatty acids 300-1,000 mg capsule, Take 2 capsules by mouth once daily., Disp: , Rfl:     hydrocodone-acetaminophen 5-325mg (NORCO) 5-325 mg per tablet, Take 1 tablet by mouth every 6 (six) hours as needed for Pain., Disp: , Rfl:     rosuvastatin (CRESTOR) 10 MG tablet, TAKE 1 ORAL TABLET ONCE A DAY, Disp: 30 tablet, Rfl: 6    abacavir-lamivudine (EPZICOM) 600-300 mg per tablet, Take 1 tablet by mouth once daily., Disp: 30 tablet, Rfl: 5    atazanavir (REYATAZ) 200 MG Cap, Take 2 capsules (400 mg total) by mouth once daily., Disp: 60 capsule, Rfl: 5    FLUARIX QUAD 1170-0969, PF, 60 mcg (15 mcg x 4)/0.5 mL Syrg vaccine, ADM 0.5ML IM UTD, Disp: , Rfl: 0    HYDROcodone-acetaminophen (NORCO) 5-325 mg per tablet, Take 1 tablet by mouth every 6 (six) hours as needed for Pain., Disp: 28 tablet, Rfl: 0    lisinopril 10 MG tablet, Take 1 tablet (10 mg total) by mouth once daily., Disp: 30 tablet, Rfl: 11    rosuvastatin (CRESTOR) 10 MG tablet, Take 1 tablet (10 mg total) by mouth nightly., Disp: 30 tablet, Rfl: 11    SHINGRIX, PF, 50 mcg/0.5 mL injection, ADM 0.5ML IM UTD, Disp: , Rfl: 0  Review of patient's allergies indicates:  No Known Allergies  Past Medical History:   Diagnosis Date    Hepatitis A     serology positive    History of MRSA infection     Recurring    HIV positive 2008    Hypercholesteremia 2011    Hyperglycemia     Hypertension     LFTs abnormal     Mild    Scoliosis     With occasional back pain    Sleep apnea     Non compliant with CPAP     Past Surgical History:   Procedure Laterality Date    COLONOSCOPY  08/2010    ELBOW FRACTURE SURGERY  1968    HEMORRHOID SURGERY N/A 1980    TONSILLECTOMY  1964     Social History     Socioeconomic History    Marital status: Single      Spouse name: Not on file    Number of children: Not on file    Years of education: Not on file    Highest education level: Not on file   Occupational History    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   Social Needs    Financial resource strain: Not on file    Food insecurity:     Worry: Not on file     Inability: Not on file    Transportation needs:     Medical: Not on file     Non-medical: Not on file   Tobacco Use    Smoking status: Never Smoker    Smokeless tobacco: Never Used   Substance and Sexual Activity    Alcohol use: Yes     Alcohol/week: 10.0 standard drinks     Types: 12 drink(s) per week    Drug use: No    Sexual activity: Not on file   Lifestyle    Physical activity:     Days per week: Not on file     Minutes per session: Not on file    Stress: Not on file   Relationships    Social connections:     Talks on phone: Not on file     Gets together: Not on file     Attends Baptism service: Not on file     Active member of club or organization: Not on file     Attends meetings of clubs or organizations: Not on file     Relationship status: Not on file   Other Topics Concern    Not on file   Social History Narrative    The patient does exercise regularly (bikes 6x/week).  Rates diet as good.  He is not satisfied with weight.         Family History   Problem Relation Age of Onset    Osteoarthritis Mother     Stroke Father 86    Hyperlipidemia Brother         Tinnitus    Cancer Maternal Grandmother         lung    Cancer Maternal Grandfather         lung       Travel History: [place], [place], [place], [place], [place], [place], [place]  Vaccine History: yearly flu vaccine, MMR 2011, pneumovax 2008, prevnar 2014, eIPV 2011, typhoid 2011, 2014, HBV 1,2,3 and booster 2015, TdaP 2008, menactra 2017, zostavax 2017, shingrix 2018 x2  Advanced Directive:   Safer Sex: abstinent  Bone Density: 8/2012 osteopenia, taking calcium plus D, 2018  "improved  Colonoscopy: 8/2010    Review of Systems    Constitutional: No fever, chills, sweats, fatigue, weakness, weight loss    Eyes: No change in vision, loss of vision, diplopia, photophobia. UTD eye exam    ENT:  Mild allergic rhinitis. No purulent sinus drainage, sore throat, mouth pain, or lesions. UTD dental exam.    Cardiovascular: No chest pain, AGRAWAL, palpitations or pedal edema.      Respiratory: No shortness of breath, AGRAWAL, cough, wheeze, sputum, pleurisy, or hemoptysis    Gastrointestinal: No abdominal pain, nausea, vomiting, diarrhea, constipation, blood in stool, or focal abd pain    Genitourinary: No dysuria, hematuria,     Musculoskeletal: No new pain, joint swelling, or injuries, but  A flare of old "scoliosis" pain, which she has periodically    Integumentary: no new skin lesions or wounds    Neurological: No dizziness, vertigo, unusual headaches, neuropathy,    Psychiatric: No   memory loss, sleep disturbance .  Drinking more lately because of stress.  Counseled that this is bad for his blood pressure, blood sugar and liver.  He is aware of all of these arguments.  I offered him an antidepressant which he declined    Endocrine:  Thyroid normal    Lymphatic: No lymphadenopathy, blood loss, anemia, or malignancy    Objective:      Blood pressure (!) 160/109, pulse 100, temperature 96.7 °F (35.9 °C), temperature source Temporal, height 6' (1.829 m), weight 92.1 kg (203 lb), SpO2 99 %. Body mass index is 27.53 kg/m².  Physical Exam  180/100 on my measurement    General: Alert and attentive, cooperative and in no distress    Eyes: Pupils equal, round, reactive to light, anicteric, EOMI    Neck: Supple, non-tender, no thyromegaly or masses    ENT: EAC patent, soft dry cerumen,  nares patent,  teeth in good condition, no thrush. There is a 4 mm patch of white with rough surface in the posterior left soft palate. Non tender, does not scrape off.  June 2019           Cardiovascular: Regular rate and " rhythm, no murmurs, rubs, or gallop    Respiratory: Lungs clear without wheezes, no rales, rub or rhonci    Gastrointestinal: Active bowel sounds, soft, no mass or organomegaly, no tenderness or distention    Genitourinary: No  flank tenderness    Vascular: No peripheral edema, phlebitis, pulses normal. Warm and well perfused    Musculoskeletal: Ambulates without difficulty, no acute arthritis, synovitis or myositis. Normal muscle bulk and strength    Integumentary:  No new suspicious skin lesions    AnusRectum: slightly decreased tone, prostate 10g no nodules. No external lesions June 2019    Neurological: Normal LOC, cranial nerves, speech, reflexes, normal gait.      Psychiatric: Normal mood, speech, demeanor    Lymphatic: No cervical, supraclavicular, axillary, or inguinal lymphadenopathy      Wound:     HIV Table:   HLA-B 5701  negative   TOXOIgG     RPR 6/2019 non reactive   HEP A IgG 2/5/2013 pos, vaccinated   HBsAg     HBsAb 2/5/2016 positive, vax + booster   HBcAb     HBeAb     HBeAg     HCVAb 1/5/2018 negative   HBV DNA     HCV RNA     Vitamin D 1/5/2018 normal,32   Chlamydia / GC     TB Gold  12/2018   Negative: IPPD yearly negative 2018  PSA    12/2018  2.1      Recent Diagnostics: Reviewed lab up and through june 19 at which time his viral load was detectable but less than 40       Assessment and Plan:           Human immunodeficiency virus (HIV) disease  -     Comprehensive metabolic panel; Future; Expected date: 01/31/2020  -     HIV RNA, quantitative, PCR; Future; Expected date: 01/31/2020    Abnormal liver function test  -     Comprehensive metabolic panel; Future; Expected date: 01/31/2020    HIV positive  -     atazanavir (REYATAZ) 200 MG Cap; Take 2 capsules (400 mg total) by mouth once daily.  Dispense: 60 capsule; Refill: 5    Hypercholesteremia  -     rosuvastatin (CRESTOR) 10 MG tablet; TAKE 1 ORAL TABLET ONCE A DAY  Dispense: 30 tablet; Refill: 6    Hypertension, unspecified  type    Pre-diabetes    Other orders  -     lisinopril 10 MG tablet; Take 1 tablet (10 mg total) by mouth once daily.  Dispense: 30 tablet; Refill: 11  -     abacavir-lamivudine (EPZICOM) 600-300 mg per tablet; Take 1 tablet by mouth once daily.  Dispense: 30 tablet; Refill: 5  -     rosuvastatin (CRESTOR) 10 MG tablet; Take 1 tablet (10 mg total) by mouth nightly.  Dispense: 30 tablet; Refill: 11  -     HYDROcodone-acetaminophen (NORCO) 5-325 mg per tablet; Take 1 tablet by mouth every 6 (six) hours as needed for Pain.  Dispense: 28 tablet; Refill: 0      Lisinopril 10 mg daily for your blood pressure  I would like to check your blood pressure again in 6 weeks  Please get a good blood pressure monitor and record your blood pressure twice a day and bring with you.  Consult to reduce alcohol to assistant blood pressure control, blood sugar control so that he does not end up on more medication for diabetes  I would like to check your viral load again in 4-6 weeks.  Come back mid january    This note was created using Dragon voice recognition software that occasionally misinterpreted phrases or words.

## 2019-12-20 NOTE — PATIENT INSTRUCTIONS
Lisinopril 10 mg daily for your blood pressure  I would like to check your blood pressure again in 6 weeks  Please get a good blood pressure monitor and record your blood pressure twice a day and bring with you.    I would like to check your viral load again in 4-6 weeks.  Come back mid january

## 2020-01-24 ENCOUNTER — LAB VISIT (OUTPATIENT)
Dept: LAB | Facility: HOSPITAL | Age: 60
End: 2020-01-24
Attending: INTERNAL MEDICINE
Payer: COMMERCIAL

## 2020-01-24 DIAGNOSIS — R79.89 ABNORMAL LIVER FUNCTION TEST: ICD-10-CM

## 2020-01-24 DIAGNOSIS — B20 HUMAN IMMUNODEFICIENCY VIRUS (HIV) DISEASE: ICD-10-CM

## 2020-01-24 LAB
ALBUMIN SERPL BCP-MCNC: 4.2 G/DL (ref 3.5–5.2)
ALP SERPL-CCNC: 66 U/L (ref 55–135)
ALT SERPL W/O P-5'-P-CCNC: 41 U/L (ref 10–44)
ANION GAP SERPL CALC-SCNC: 6 MMOL/L (ref 8–16)
AST SERPL-CCNC: 30 U/L (ref 10–40)
BILIRUB SERPL-MCNC: 1.2 MG/DL (ref 0.1–1)
BUN SERPL-MCNC: 18 MG/DL (ref 6–20)
CALCIUM SERPL-MCNC: 9.9 MG/DL (ref 8.7–10.5)
CHLORIDE SERPL-SCNC: 105 MMOL/L (ref 95–110)
CO2 SERPL-SCNC: 28 MMOL/L (ref 23–29)
CREAT SERPL-MCNC: 1.2 MG/DL (ref 0.5–1.4)
EST. GFR  (AFRICAN AMERICAN): >60 ML/MIN/1.73 M^2
EST. GFR  (NON AFRICAN AMERICAN): >60 ML/MIN/1.73 M^2
GLUCOSE SERPL-MCNC: 109 MG/DL (ref 70–110)
POTASSIUM SERPL-SCNC: 4.6 MMOL/L (ref 3.5–5.1)
PROT SERPL-MCNC: 7.7 G/DL (ref 6–8.4)
SODIUM SERPL-SCNC: 139 MMOL/L (ref 136–145)

## 2020-01-24 PROCEDURE — 87536 HIV-1 QUANT&REVRSE TRNSCRPJ: CPT

## 2020-01-24 PROCEDURE — 80053 COMPREHEN METABOLIC PANEL: CPT

## 2020-01-31 ENCOUNTER — OFFICE VISIT (OUTPATIENT)
Dept: INFECTIOUS DISEASES | Facility: CLINIC | Age: 60
End: 2020-01-31
Payer: COMMERCIAL

## 2020-01-31 VITALS
OXYGEN SATURATION: 95 % | DIASTOLIC BLOOD PRESSURE: 88 MMHG | SYSTOLIC BLOOD PRESSURE: 119 MMHG | BODY MASS INDEX: 27.5 KG/M2 | TEMPERATURE: 98 F | HEIGHT: 72 IN | WEIGHT: 203 LBS | HEART RATE: 81 BPM

## 2020-01-31 DIAGNOSIS — Z21 HIV POSITIVE: Primary | ICD-10-CM

## 2020-01-31 DIAGNOSIS — R73.03 PRE-DIABETES: ICD-10-CM

## 2020-01-31 DIAGNOSIS — K13.21 LEUKOPLAKIA OF ORAL CAVITY: ICD-10-CM

## 2020-01-31 DIAGNOSIS — R79.89 ABNORMAL LIVER FUNCTION TEST: ICD-10-CM

## 2020-01-31 DIAGNOSIS — I10 HYPERTENSION, UNSPECIFIED TYPE: ICD-10-CM

## 2020-01-31 PROCEDURE — 3079F DIAST BP 80-89 MM HG: CPT | Mod: S$GLB,,, | Performed by: INTERNAL MEDICINE

## 2020-01-31 PROCEDURE — 3074F PR MOST RECENT SYSTOLIC BLOOD PRESSURE < 130 MM HG: ICD-10-PCS | Mod: S$GLB,,, | Performed by: INTERNAL MEDICINE

## 2020-01-31 PROCEDURE — 99214 PR OFFICE/OUTPT VISIT, EST, LEVL IV, 30-39 MIN: ICD-10-PCS | Mod: S$GLB,,, | Performed by: INTERNAL MEDICINE

## 2020-01-31 PROCEDURE — 3008F BODY MASS INDEX DOCD: CPT | Mod: S$GLB,,, | Performed by: INTERNAL MEDICINE

## 2020-01-31 PROCEDURE — 99214 OFFICE O/P EST MOD 30 MIN: CPT | Mod: S$GLB,,, | Performed by: INTERNAL MEDICINE

## 2020-01-31 PROCEDURE — 3079F PR MOST RECENT DIASTOLIC BLOOD PRESSURE 80-89 MM HG: ICD-10-PCS | Mod: S$GLB,,, | Performed by: INTERNAL MEDICINE

## 2020-01-31 PROCEDURE — 3074F SYST BP LT 130 MM HG: CPT | Mod: S$GLB,,, | Performed by: INTERNAL MEDICINE

## 2020-01-31 PROCEDURE — 3008F PR BODY MASS INDEX (BMI) DOCUMENTED: ICD-10-PCS | Mod: S$GLB,,, | Performed by: INTERNAL MEDICINE

## 2020-01-31 RX ORDER — ATAZANAVIR 200 MG/1
400 CAPSULE ORAL DAILY
Qty: 60 CAPSULE | Refills: 3 | Status: SHIPPED | OUTPATIENT
Start: 2020-01-31 | End: 2020-06-19

## 2020-01-31 NOTE — PROGRESS NOTES
Subjective:       Patient ID: Griffin Valenzuela is a 60 y.o. male.    Chief Complaint:: HIV Positive/AIDS      12/7/19: Drinking again , same as before, 4 times a week, 4 drinks per night. Stress at work.   Visited [place] in [place]vember  Did have his flu vaccine, and at least one shingrix([employer])  No visits to PCP since last  No labs prior to this visit  Had an URI last week with fever, runny eyes and nose, cough. Rested and it resolved.     6/14/19: only complaint is a flare of his back pain. He has plenty of old hydrocodone to use. Encouraged him to use NSAID as well. His BP is always high after driving from [place]. Repeat was 140/94. His outpatient blood pressures are largely 125/80 (his record sent to us, measured by nurse at work). He did receive the second shingles vaccine. His bone density was improved compared to 2012. He was abstinent from alcohol for 4 months and resumed drinking. Encouraged moderation, because of lipids and BS. Reviewed lab, lipids are better. VL is detected less than 40, same as last summer. Adherence is excellent.     12/20/19:  He is under a great deal of stress because he is looking for new job, [place] is extra stressful and he just had to drive here from [place].  His blood pressure is always elevated when he is here but he blames it on the stress of the drive.  He has not been measuring his blood pressure as often and today it is just too high to ignore.  He rides his bike almost every day and is very fit, has No cardiovascular symptoms.  He is drinking more than usual because of the stress and he knows it is not healthy.  His liver enzymes are slightly elevated as is the bilirubin and his blood sugar is a bit higher is consistent with pre diabetes.  Viral load is less than 40 but detected and he reports missing his medication approximately twice per month.  He takes it in the morning with food and sometimes does not have time for breakfast.  He is also in a hurry today.    1/31/20:  here for a blood pressure check and the measurements are much improved on lisinopril 10 mg and abstinence from alcohol. Not really being mindful about food carbs? Stress from [place] has resolved. BP good even though he drove here(which usually elevates BP a lot). Reviewed lab and his VL is now undetectable. He had interruption of epzicom for 4 days.     Current Outpatient Medications:     abacavir-lamivudine (EPZICOM) 600-300 mg per tablet, Take 1 tablet by mouth once daily., Disp: 30 tablet, Rfl: 5    atazanavir (REYATAZ) 200 MG Cap, Take 2 capsules (400 mg total) by mouth once daily., Disp: 60 capsule, Rfl: 3    calcium-vitamin D3 (CALCIUM 500 + D) 500 mg(1,250mg) -200 unit per tablet, Take 1 tablet by mouth 2 (two) times daily with meals., Disp: , Rfl:     fish oil-omega-3 fatty acids 300-1,000 mg capsule, Take 2 capsules by mouth once daily., Disp: , Rfl:     FLUARIX QUAD [id number], PF, 60 mcg (15 mcg x 4)/0.5 mL Syrg vaccine, ADM 0.5ML IM UTD, Disp: , Rfl: 0    HYDROcodone-acetaminophen (NORCO) 5-325 mg per tablet, Take 1 tablet by mouth every 6 (six) hours as needed for Pain., Disp: 28 tablet, Rfl: 0    hydrocodone-acetaminophen 5-325mg (NORCO) 5-325 mg per tablet, Take 1 tablet by mouth every 6 (six) hours as needed for Pain., Disp: , Rfl:     lisinopril 10 MG tablet, Take 1 tablet (10 mg total) by mouth once daily., Disp: 30 tablet, Rfl: 11    rosuvastatin (CRESTOR) 10 MG tablet, Take 1 tablet (10 mg total) by mouth nightly., Disp: 30 tablet, Rfl: 11    rosuvastatin (CRESTOR) 10 MG tablet, TAKE 1 ORAL TABLET ONCE A DAY, Disp: 30 tablet, Rfl: 6    SHINGRIX, PF, 50 mcg/0.5 mL injection, ADM 0.5ML IM UTD, Disp: , Rfl: 0  Review of patient's allergies indicates:  No Known Allergies  Past Medical History:   Diagnosis Date    Hepatitis A     serology positive    History of MRSA infection     Recurring    HIV positive 2008    Hypercholesteremia 2011    Hyperglycemia     Hypertension     LFTs  abnormal     Mild    Scoliosis     With occasional back pain    Sleep apnea     Non compliant with CPAP     Past Surgical History:   Procedure Laterality Date    COLONOSCOPY  08/2010    ELBOW FRACTURE SURGERY  1968    HEMORRHOID SURGERY N/A 1980    TONSILLECTOMY  1964     Social History     Socioeconomic History    Marital status: Single     Spouse name: Not on file    Number of children: Not on file    Years of education: Not on file    Highest education level: Not on file   Occupational History    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   Social Needs    Financial resource strain: Not on file    Food insecurity:     Worry: Not on file     Inability: Not on file    Transportation needs:     Medical: Not on file     Non-medical: Not on file   Tobacco Use    Smoking status: Never Smoker    Smokeless tobacco: Never Used   Substance and Sexual Activity    Alcohol use: Yes     Alcohol/week: 10.0 standard drinks     Types: 12 drink(s) per week    Drug use: No    Sexual activity: Not on file   Lifestyle    Physical activity:     Days per week: Not on file     Minutes per session: Not on file    Stress: Not on file   Relationships    Social connections:     Talks on phone: Not on file     Gets together: Not on file     Attends Bahai service: Not on file     Active member of club or organization: Not on file     Attends meetings of clubs or organizations: Not on file     Relationship status: Not on file   Other Topics Concern    Not on file   Social History Narrative    The patient does exercise regularly (bikes 6x/week).  Rates diet as good.  He is not satisfied with weight.         Family History   Problem Relation Age of Onset    Osteoarthritis Mother     Stroke Father 86    Hyperlipidemia Brother         Tinnitus    Cancer Maternal Grandmother         lung    Cancer Maternal Grandfather         lung       Travel History: [place], [place], [place], [place]  Vaccine History: yearly flu vaccine, MMR 2011, pneumovax 2008, prevnar 2014, eIPV 2011, typhoid 2011, 2014, HBV 1,2,3 and booster 2015, TdaP 2008, menactra 2017, zostavax 2017, shingrix 2018 x2  Advanced Directive:   Safer Sex: abstinent  Bone Density: 8/2012 osteopenia, taking calcium plus D, 2018 improved  Colonoscopy: 8/2010    Review of Systems    Constitutional: No fever, chills, sweats, fatigue, weakness, weight loss    Eyes: No change in vision, loss of vision, diplopia, photophobia. UTD eye exam    ENT:  Mild allergic rhinitis. No purulent sinus drainage, sore throat, mouth pain, or lesions. UTD dental exam.    Cardiovascular: No chest pain, AGRAWAL, palpitations or pedal edema.      Respiratory: No shortness of breath, AGRAWAL,      Gastrointestinal: No abdominal pain, nausea, vomiting, diarrhea,      Genitourinary: No dysuria, hematuria,     Musculoskeletal: No new pain, joint swelling, or injuries,      Integumentary: no new skin lesions or wounds    Neurological: No dizziness, vertigo, unusual headaches, neuropathy,    Psychiatric: No   memory loss, sleep disturbance .  No alcohol since 12/30.     Endocrine:  Thyroid normal    Lymphatic: No lymphadenopathy, blood loss, anemia, or malignancy    Objective:      Blood pressure 119/88, pulse 81, temperature 97.9 °F (36.6 °C), temperature source Temporal, height 6' (1.829 m), weight 92.1 kg (203 lb), SpO2 95 %. Body mass index is 27.53 kg/m².  Physical Exam       General: Alert and attentive, cooperative and in no distress    Eyes: Pupils equal, round, reactive to light, anicteric, EOMI    Neck: Supple, non-tender, no thyromegaly or masses    ENT: EAC with bilateral impactions of dry cerumen,  nares patent,  teeth in good condition, no thrush. There is a 4 mm patch of white with rough surface in the posterior left soft palate. Non tender, does not scrape off. since June 2019 and has not changed 1/31/20.     1/31/20: lesion  unchanged in size. Since 6.2019      Cardiovascular: Regular rate and rhythm, no murmurs, rubs, or gallop    Respiratory: Lungs clear without wheezes, no rales, rub or rhonci    Gastrointestinal:      Genitourinary:     Vascular: No peripheral edema, phlebitis,     Musculoskeletal: Ambulates without difficulty, no acute arthritis     Integumentary:       AnusRectum: slightly decreased tone, prostate 10g no nodules. No external lesions June 2019    Neurological: Normal LOC, cranial nerves, speech,  normal gait.      Psychiatric: Normal mood, speech, demeanor    Lymphatic:        Wound:     HIV Table:   HLA-B 5701  negative   TOXOIgG     RPR 6/2019 non reactive   HEP A IgG 2/5/2013 pos, vaccinated   HBsAg     HBsAb 2/5/2016 positive, vax + booster   HBcAb     HBeAb     HBeAg     HCVAb 1/5/2018 negative   HBV DNA     HCV RNA     Vitamin D 1/5/2018 normal,32   Chlamydia / GC     TB Gold  12/2018   Negative: IPPD yearly negative 2018  PSA    12/2018  2.1      Recent Diagnostics: Reviewed lab up and through june 19 at which time his viral load was detectable but less than 40       Assessment and Plan:           HIV positive  -     atazanavir (REYATAZ) 200 MG Cap; Take 2 capsules (400 mg total) by mouth once daily.  Dispense: 60 capsule; Refill: 3    Hypertension, unspecified type    Abnormal liver function test  -     Lipid panel; Future; Expected date: 04/20/2020    Pre-diabetes  -     Comprehensive metabolic panel; Future; Expected date: 04/20/2020  -     Lipid panel; Future; Expected date: 04/20/2020  -     Hemoglobin A1c; Future; Expected date: 04/20/2020    Leukoplakia of oral cavity    LFTs improved off alcohol. Has never had elevated bili on atazanavir  Continue same medications including the lisinopril  Please continue to be prudent with carbohydrates    We will repeat chemistries in 3 months, and regular lab in 5-6 months    Return in 5-6 months    Please make an appointment with your ENT to examine the spot on  your left palate. It has not changed since 6/2019  This note was created using Dragon voice recognition software that occasionally misinterpreted phrases or words.

## 2020-01-31 NOTE — PATIENT INSTRUCTIONS
Continue same medications including the lisinopril  Please continue to be prudent with carbohydrates    We will repeat chemistries in 3 months, and regular lab in 5-6 months    Return in 5-6 months    Please make an appointment with your ENT to examine the spot on your left palate. It has not changed since 6/2019

## 2020-06-11 ENCOUNTER — LAB VISIT (OUTPATIENT)
Dept: LAB | Facility: HOSPITAL | Age: 60
End: 2020-06-11
Attending: FAMILY MEDICINE
Payer: COMMERCIAL

## 2020-06-11 DIAGNOSIS — R79.89 ABNORMAL LIVER FUNCTION TEST: ICD-10-CM

## 2020-06-11 DIAGNOSIS — R73.03 PRE-DIABETES: ICD-10-CM

## 2020-06-11 LAB
ALBUMIN SERPL BCP-MCNC: 4.1 G/DL (ref 3.5–5.2)
ALP SERPL-CCNC: 66 U/L (ref 55–135)
ALT SERPL W/O P-5'-P-CCNC: 31 U/L (ref 10–44)
ANION GAP SERPL CALC-SCNC: 7 MMOL/L (ref 8–16)
AST SERPL-CCNC: 25 U/L (ref 10–40)
BILIRUB SERPL-MCNC: 2.1 MG/DL (ref 0.1–1)
BUN SERPL-MCNC: 10 MG/DL (ref 8–23)
CALCIUM SERPL-MCNC: 10.1 MG/DL (ref 8.7–10.5)
CHLORIDE SERPL-SCNC: 105 MMOL/L (ref 95–110)
CHOLEST SERPL-MCNC: 133 MG/DL (ref 120–199)
CHOLEST/HDLC SERPL: 3.1 {RATIO} (ref 2–5)
CO2 SERPL-SCNC: 28 MMOL/L (ref 23–29)
CREAT SERPL-MCNC: 1.3 MG/DL (ref 0.5–1.4)
EST. GFR  (AFRICAN AMERICAN): >60 ML/MIN/1.73 M^2
EST. GFR  (NON AFRICAN AMERICAN): 58.9 ML/MIN/1.73 M^2
ESTIMATED AVG GLUCOSE: 134 MG/DL (ref 68–131)
GLUCOSE SERPL-MCNC: 113 MG/DL (ref 70–110)
HBA1C MFR BLD HPLC: 6.3 % (ref 4–5.6)
HDLC SERPL-MCNC: 43 MG/DL (ref 40–75)
HDLC SERPL: 32.3 % (ref 20–50)
LDLC SERPL CALC-MCNC: 69.4 MG/DL (ref 63–159)
NONHDLC SERPL-MCNC: 90 MG/DL
POTASSIUM SERPL-SCNC: 4.2 MMOL/L (ref 3.5–5.1)
PROT SERPL-MCNC: 7.8 G/DL (ref 6–8.4)
SODIUM SERPL-SCNC: 140 MMOL/L (ref 136–145)
TRIGL SERPL-MCNC: 103 MG/DL (ref 30–150)

## 2020-06-11 PROCEDURE — 83036 HEMOGLOBIN GLYCOSYLATED A1C: CPT

## 2020-06-11 PROCEDURE — 80061 LIPID PANEL: CPT

## 2020-06-11 PROCEDURE — 80053 COMPREHEN METABOLIC PANEL: CPT

## 2020-06-11 PROCEDURE — 36415 COLL VENOUS BLD VENIPUNCTURE: CPT | Mod: PO

## 2020-06-18 ENCOUNTER — OFFICE VISIT (OUTPATIENT)
Dept: INFECTIOUS DISEASES | Facility: CLINIC | Age: 60
End: 2020-06-18
Payer: COMMERCIAL

## 2020-06-18 VITALS
TEMPERATURE: 98 F | SYSTOLIC BLOOD PRESSURE: 108 MMHG | HEART RATE: 84 BPM | DIASTOLIC BLOOD PRESSURE: 82 MMHG | WEIGHT: 195 LBS | BODY MASS INDEX: 26.45 KG/M2 | OXYGEN SATURATION: 97 %

## 2020-06-18 DIAGNOSIS — M54.50 ACUTE LEFT-SIDED LOW BACK PAIN WITHOUT SCIATICA: ICD-10-CM

## 2020-06-18 DIAGNOSIS — R79.89 LFTS ABNORMAL: ICD-10-CM

## 2020-06-18 DIAGNOSIS — Z21 HIV POSITIVE: Primary | ICD-10-CM

## 2020-06-18 DIAGNOSIS — Z12.11 SCREENING FOR COLON CANCER: ICD-10-CM

## 2020-06-18 DIAGNOSIS — R79.89 ABNORMAL LIVER FUNCTION TEST: ICD-10-CM

## 2020-06-18 DIAGNOSIS — E78.00 HYPERCHOLESTEREMIA: ICD-10-CM

## 2020-06-18 DIAGNOSIS — R73.03 PRE-DIABETES: ICD-10-CM

## 2020-06-18 DIAGNOSIS — K13.21 LEUKOPLAKIA OF ORAL CAVITY: ICD-10-CM

## 2020-06-18 DIAGNOSIS — I10 HYPERTENSION, UNSPECIFIED TYPE: ICD-10-CM

## 2020-06-18 DIAGNOSIS — Z71.89 EDUCATED ABOUT COVID-19 VIRUS INFECTION: ICD-10-CM

## 2020-06-18 PROCEDURE — 90471 PNEUMOCOCCAL POLYSACCHARIDE VACCINE 23-VALENT =>2YO SQ IM: ICD-10-PCS | Mod: S$GLB,,, | Performed by: INTERNAL MEDICINE

## 2020-06-18 PROCEDURE — 3079F DIAST BP 80-89 MM HG: CPT | Mod: S$GLB,,, | Performed by: INTERNAL MEDICINE

## 2020-06-18 PROCEDURE — 3074F PR MOST RECENT SYSTOLIC BLOOD PRESSURE < 130 MM HG: ICD-10-PCS | Mod: S$GLB,,, | Performed by: INTERNAL MEDICINE

## 2020-06-18 PROCEDURE — 3079F PR MOST RECENT DIASTOLIC BLOOD PRESSURE 80-89 MM HG: ICD-10-PCS | Mod: S$GLB,,, | Performed by: INTERNAL MEDICINE

## 2020-06-18 PROCEDURE — 90471 IMMUNIZATION ADMIN: CPT | Mod: S$GLB,,, | Performed by: INTERNAL MEDICINE

## 2020-06-18 PROCEDURE — 99215 PR OFFICE/OUTPT VISIT, EST, LEVL V, 40-54 MIN: ICD-10-PCS | Mod: 25,S$GLB,, | Performed by: INTERNAL MEDICINE

## 2020-06-18 PROCEDURE — 99215 OFFICE O/P EST HI 40 MIN: CPT | Mod: 25,S$GLB,, | Performed by: INTERNAL MEDICINE

## 2020-06-18 PROCEDURE — 90732 PPSV23 VACC 2 YRS+ SUBQ/IM: CPT | Mod: S$GLB,,, | Performed by: INTERNAL MEDICINE

## 2020-06-18 PROCEDURE — 3074F SYST BP LT 130 MM HG: CPT | Mod: S$GLB,,, | Performed by: INTERNAL MEDICINE

## 2020-06-18 PROCEDURE — 90732 PNEUMOCOCCAL POLYSACCHARIDE VACCINE 23-VALENT =>2YO SQ IM: ICD-10-PCS | Mod: S$GLB,,, | Performed by: INTERNAL MEDICINE

## 2020-06-18 NOTE — PATIENT INSTRUCTIONS
Ibuprofen 3-4 tabs up to 3 times a day with food for up to a week.   If your back pain persists, you can either see a physical therapist or I can refer  You to a back orthopedist or neurosurgeon.     Due for colonoscopy this year, please call to schedule ([facility]?) Dr. Lefty Eaton and Dr. Dwayne Vela  [phone]    Repeat liver test in one month, lab in [facility]    Flu vaccine in October/November    Return in 6 months    Pneumovax     Continue protecting yourself from COVID

## 2020-06-18 NOTE — PROGRESS NOTES
Subjective:       Patient ID: Griffin Valenzuela is a 60 y.o. male.    Chief Complaint:: HIV Positive/AIDS (follow up )      12/7/19: Drinking again , same as before, 4 times a week, 4 drinks per night. Stress at work.   Visited [place] in [place]mber  Did have his flu vaccine, and at least one shingrix([employer])  No visits to PCP since last  No labs prior to this visit  Had an URI last week with fever, runny eyes and nose, cough. Rested and it resolved.     6/14/19: only complaint is a flare of his back pain. He has plenty of old hydrocodone to use. Encouraged him to use NSAID as well. His BP is always high after driving from [place]. Repeat was 140/94. His outpatient blood pressures are largely 125/80 (his record sent to us, measured by nurse at work). He did receive the second shingles vaccine. His bone density was improved compared to 2012. He was abstinent from alcohol for 4 months and resumed drinking. Encouraged moderation, because of lipids and BS. Reviewed lab, lipids are better. VL is detected less than 40, same as last summer. Adherence is excellent.     12/20/19:  He is under a great deal of stress because he is looking for new job, [place] is extra stressful and he just had to drive here from [place].  His blood pressure is always elevated when he is here but he blames it on the stress of the drive.  He has not been measuring his blood pressure as often and today it is just too high to ignore.  He rides his bike almost every day and is very fit, has No cardiovascular symptoms.  He is drinking more than usual because of the stress and he knows it is not healthy.  His liver enzymes are slightly elevated as is the bilirubin and his blood sugar is a bit higher is consistent with pre diabetes.  Viral load is less than 40 but detected and he reports missing his medication approximately twice per month.  He takes it in the morning with food and sometimes does not have time for breakfast.  He is also in a hurry  today.    1/31/20: here for a blood pressure check and the measurements are much improved on lisinopril 10 mg and abstinence from alcohol. Not really being mindful about food carbs? Stress from [place] has resolved. BP good even though he drove here(which usually elevates BP a lot). Reviewed lab and his VL is now undetectable. He had interruption of epzicom for 4 days.     6/18/20: usual HIV follow up. He is having flare up of back pain. Taking hydrocodone without relief. Took ibuprofen OTC in the past, but not lately. He has been sleeping on his stomach a little more lately which may be a factor. He does a little stretching when this occurs. Last visit with an orthopedist for his back was 1990ish. No recognized muscle spasm and no pain in the leg. He associates this with scoliosis.   Reviewed labs and bilirubin 2.1. He has not had any alcohol since mardi gras. Negative abd US 3 years ago. Has been riding his bike 2x a week, since he is working from home. He has been protecting himself from COVID. He is limiting white carbs. Discussed A1c . Lipids much better. He saw ENt about the spot on his palate and she is observing it with recheck(Dr. Kylah cardona)     Current Outpatient Medications:     abacavir-lamivudine (EPZICOM) 600-300 mg per tablet, Take 1 tablet by mouth once daily., Disp: 30 tablet, Rfl: 5    atazanavir (REYATAZ) 200 MG Cap, Take 2 capsules (400 mg total) by mouth once daily., Disp: 60 capsule, Rfl: 3    calcium-vitamin D3 (CALCIUM 500 + D) 500 mg(1,250mg) -200 unit per tablet, Take 1 tablet by mouth 2 (two) times daily with meals., Disp: , Rfl:     fish oil-omega-3 fatty acids 300-1,000 mg capsule, Take 2 capsules by mouth once daily., Disp: , Rfl:     FLUARIX QUAD [id number], PF, 60 mcg (15 mcg x 4)/0.5 mL Syrg vaccine, ADM 0.5ML IM UTD, Disp: , Rfl: 0    HYDROcodone-acetaminophen (NORCO) 5-325 mg per tablet, Take 1 tablet by mouth every 6 (six) hours as needed for Pain., Disp: 28 tablet,  Rfl: 0    lisinopril 10 MG tablet, Take 1 tablet (10 mg total) by mouth once daily., Disp: 30 tablet, Rfl: 11    rosuvastatin (CRESTOR) 10 MG tablet, Take 1 tablet (10 mg total) by mouth nightly., Disp: 30 tablet, Rfl: 11    SHINGRIX, PF, 50 mcg/0.5 mL injection, ADM 0.5ML IM UTD, Disp: , Rfl: 0    hydrocodone-acetaminophen 5-325mg (NORCO) 5-325 mg per tablet, Take 1 tablet by mouth every 6 (six) hours as needed for Pain., Disp: , Rfl:     rosuvastatin (CRESTOR) 10 MG tablet, TAKE 1 ORAL TABLET ONCE A DAY, Disp: 30 tablet, Rfl: 6     Review of patient's allergies indicates:      No Known Allergies  Past Medical History:   Diagnosis Date    Hepatitis A     serology positive    History of MRSA infection     Recurring    HIV positive 2008    Hypercholesteremia 2011    Hyperglycemia     Hypertension     LFTs abnormal     Mild    Scoliosis     With occasional back pain    Sleep apnea     Non compliant with CPAP     Past Surgical History:   Procedure Laterality Date    COLONOSCOPY  08/2010    ELBOW FRACTURE SURGERY  1968    HEMORRHOID SURGERY N/A 1980    TONSILLECTOMY  1964     Social History     Socioeconomic History    Marital status: Single     Spouse name: Not on file    Number of children: Not on file    Years of education: Not on file    Highest education level: Not on file   Occupational History    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   Social Needs    Financial resource strain: Not on file    Food insecurity     Worry: Not on file     Inability: Not on file    Transportation needs     Medical: Not on file     Non-medical: Not on file   Tobacco Use    Smoking status: Never Smoker    Smokeless tobacco: Never Used   Substance and Sexual Activity    Alcohol use: Yes     Alcohol/week: 10.0 standard drinks     Types: 12 drink(s) per week    Drug use: No    Sexual activity: Not on file   Lifestyle    Physical activity     Days per week: Not on file     Minutes  per session: Not on file    Stress: Not on file   Relationships    Social connections     Talks on phone: Not on file     Gets together: Not on file     Attends Restorationism service: Not on file     Active member of club or organization: Not on file     Attends meetings of clubs or organizations: Not on file     Relationship status: Not on file   Other Topics Concern    Not on file   Social History Narrative    The patient does exercise regularly (bikes 6x/week).  Rates diet as good.  He is not satisfied with weight.         Family History   Problem Relation Age of Onset    Osteoarthritis Mother     Stroke Father 86    Hyperlipidemia Brother         Tinnitus    Cancer Maternal Grandmother         lung    Cancer Maternal Grandfather         lung       Travel History: [place], [place], [place], [place], [place], [place], [place]  Vaccine History: yearly flu vaccine, MMR 2011, pneumovax 2008, prevnar 2014, eIPV 2011, typhoid 2011, 2014, HBV 1,2,3 and booster 2015, TdaP 2008, menactra 2017, zostavax 2017, shingrix 2018 x2  Advanced Directive:   Safer Sex: abstinent  Bone Density: 8/2012 osteopenia, taking calcium plus D, 1/2019 improved  Colonoscopy: 8/2010    Review of Systems    Constitutional: No fever, chills, sweats, fatigue, weakness, weight loss    Eyes: No change in vision, loss of vision, diplopia, photophobia. UTD eye exam    ENT:  Mild allergic rhinitis/seasonal allergies. No purulent sinus drainage, sore throat, mouth pain, or lesions. UTD dental exam, 1 month ago   Cardiovascular: No chest pain, AGRAWAL, palpitations or pedal edema.      Respiratory: No shortness of breath, AGRAWAL,      Gastrointestinal: No abdominal pain, nausea, vomiting, diarrhea,  Blood in stool    Genitourinary: No dysuria, hematuria,     Musculoskeletal: see HPI,      Integumentary: no new skin lesions or wounds    Neurological: No dizziness, vertigo, unusual headaches, neuropathy,    Psychiatric: No   memory  loss, sleep disturbance .  No alcohol since mardi gras    Endocrine:  Thyroid normal    Lymphatic: No lymphadenopathy, blood loss, anemia, or malignancy    Objective:      Blood pressure 108/82, pulse 84, temperature 98.1 °F (36.7 °C), weight 88.5 kg (195 lb), SpO2 97 %. Body mass index is 26.45 kg/m².  Physical Exam       General: Alert and attentive, cooperative and in no distress    Eyes: Pupils equal, round, reactive to light, anicteric, EOMI    Neck: Supple, non-tender, no thyromegaly or masses    ENT: EAC with bilateral impactions of dry cerumen,  nares patent,  teeth in good condition, no thrush. There is a 4 mm patch of white with rough surface in the posterior left soft palate. Non tender, does not scrape off. since June 2019 and has not changed 1/31/20.       1/31/20 6/18/20 1/31/20: lesion unchanged in size. Since 6.2019 6/18/20: the patch of leukoplakia is no larger but now irregular    Cardiovascular: Regular rate and rhythm, no murmurs, rubs, or gallop    Respiratory: Lungs clear without wheezes, no rales, rub or rhonci    Gastrointestinal:  Soft and non tender, BS pos, no mass or organomegaly    Genitourinary: no flank tenderness    Vascular: No peripheral edema, phlebitis, warm and well perfused    Musculoskeletal: Ambulates without difficulty, no acute arthritis . Normal strength, negative SLR bilaterally. Mild discomfort when lifting left leg against resistance. Some left paraspinous tenderness, L4-5    Integumentary:   Tan from being outside. No suspicious skin lesions    AnusRectum: slightly decreased tone, prostate 10g no nodules. No external lesions June 2019    Neurological: Normal LOC, cranial nerves, speech,  normal gait.  Reflexes are normal    Psychiatric: Normal mood, speech, demeanor    Lymphatic:   No cervical, supraclavicular, axillary or inguinal nodes     Wound:     HIV Table:   HLA-B 5701  negative   TOXOIgG     RPR 6/2019 non reactive   HEP A IgG 2/5/2013 pos, vaccinated    HBsAg     HBsAb 2/5/2016 positive, vax + booster   HBcAb     HBeAb     HBeAg     HCVAb 1/5/2018 negative   HBV DNA     HCV RNA     Vitamin D 1/5/2018 normal,32   Chlamydia / GC     TB Gold  12/2018   Negative: IPPD yearly negative 2018  PSA    12/2019  1.2      Recent Diagnostics: Reviewed lab        Assessment and Plan:           There are no diagnoses linked to this encounter.     LFTs improved off alcohol. Has never had elevated bili on atazanavir, higher than 1.7      This note was created using Dragon voice recognition software that occasionally misinterpreted phrases or words.

## 2020-06-19 DIAGNOSIS — Z21 HIV POSITIVE: Primary | ICD-10-CM

## 2020-06-19 RX ORDER — ATAZANAVIR 200 MG/1
200 CAPSULE ORAL
Qty: 30 CAPSULE | Refills: 5 | Status: SHIPPED | OUTPATIENT
Start: 2020-06-19 | End: 2020-12-17 | Stop reason: SDUPTHER

## 2020-06-19 RX ORDER — LISINOPRIL 10 MG/1
10 TABLET ORAL DAILY
Qty: 30 TABLET | Refills: 11 | Status: SHIPPED | OUTPATIENT
Start: 2020-06-19 | End: 2020-12-17

## 2020-06-19 RX ORDER — ROSUVASTATIN CALCIUM 10 MG/1
10 TABLET, COATED ORAL DAILY
Qty: 30 TABLET | Refills: 11 | Status: SHIPPED | OUTPATIENT
Start: 2020-06-19 | End: 2020-12-17 | Stop reason: SDUPTHER

## 2020-06-19 RX ORDER — ABACAVIR AND LAMIVUDINE 600; 300 MG/1; MG/1
1 TABLET, FILM COATED ORAL DAILY
Qty: 30 TABLET | Refills: 5 | Status: SHIPPED | OUTPATIENT
Start: 2020-06-19 | End: 2020-12-17

## 2020-07-17 ENCOUNTER — TELEPHONE (OUTPATIENT)
Dept: INFECTIOUS DISEASES | Facility: CLINIC | Age: 60
End: 2020-07-17

## 2020-07-17 DIAGNOSIS — Z21 HIV POSITIVE: Primary | ICD-10-CM

## 2020-07-17 NOTE — TELEPHONE ENCOUNTER
----- Message from Yoli Borja sent at 7/16/2020  4:24 PM CDT -----  Regarding: lab  Name of Who is Calling: BRENDA FONTAINE [[id number]]      What is the request in detail: Patient is requesting orders for his lab test to be placed in the system       Can the clinic reply by [facility]: no      What Number to Call Back if not in [facility]: [phone]

## 2020-07-28 ENCOUNTER — LAB VISIT (OUTPATIENT)
Dept: LAB | Facility: HOSPITAL | Age: 60
End: 2020-07-28
Attending: INTERNAL MEDICINE
Payer: COMMERCIAL

## 2020-07-28 DIAGNOSIS — R79.89 ABNORMAL LIVER FUNCTION TEST: ICD-10-CM

## 2020-07-28 LAB
ALBUMIN SERPL BCP-MCNC: 4 G/DL (ref 3.5–5.2)
ALP SERPL-CCNC: 59 U/L (ref 55–135)
ALT SERPL W/O P-5'-P-CCNC: 32 U/L (ref 10–44)
ANION GAP SERPL CALC-SCNC: 6 MMOL/L (ref 8–16)
AST SERPL-CCNC: 26 U/L (ref 10–40)
BILIRUB SERPL-MCNC: 1.9 MG/DL (ref 0.1–1)
BUN SERPL-MCNC: 12 MG/DL (ref 8–23)
CALCIUM SERPL-MCNC: 9.6 MG/DL (ref 8.7–10.5)
CHLORIDE SERPL-SCNC: 106 MMOL/L (ref 95–110)
CO2 SERPL-SCNC: 28 MMOL/L (ref 23–29)
CREAT SERPL-MCNC: 1.1 MG/DL (ref 0.5–1.4)
EST. GFR  (AFRICAN AMERICAN): >60 ML/MIN/1.73 M^2
EST. GFR  (NON AFRICAN AMERICAN): >60 ML/MIN/1.73 M^2
GLUCOSE SERPL-MCNC: 104 MG/DL (ref 70–110)
POTASSIUM SERPL-SCNC: 4.2 MMOL/L (ref 3.5–5.1)
PROT SERPL-MCNC: 7.6 G/DL (ref 6–8.4)
SODIUM SERPL-SCNC: 140 MMOL/L (ref 136–145)

## 2020-07-28 PROCEDURE — 36415 COLL VENOUS BLD VENIPUNCTURE: CPT | Mod: PO

## 2020-07-28 PROCEDURE — 80053 COMPREHEN METABOLIC PANEL: CPT

## 2020-08-25 ENCOUNTER — CLINICAL SUPPORT (OUTPATIENT)
Dept: URGENT CARE | Facility: CLINIC | Age: 60
End: 2020-08-25
Payer: COMMERCIAL

## 2020-08-25 VITALS — TEMPERATURE: 98 F | OXYGEN SATURATION: 96 % | RESPIRATION RATE: 18 BRPM | HEART RATE: 69 BPM

## 2020-08-25 DIAGNOSIS — Z01.818 PRE-OP TESTING: ICD-10-CM

## 2020-08-25 LAB — SARS-COV-2 RNA RESP QL NAA+PROBE: NOT DETECTED

## 2020-08-25 PROCEDURE — U0003 INFECTIOUS AGENT DETECTION BY NUCLEIC ACID (DNA OR RNA); SEVERE ACUTE RESPIRATORY SYNDROME CORONAVIRUS 2 (SARS-COV-2) (CORONAVIRUS DISEASE [COVID-19]), AMPLIFIED PROBE TECHNIQUE, MAKING USE OF HIGH THROUGHPUT TECHNOLOGIES AS DESCRIBED BY CMS-2020-01-R: HCPCS

## 2020-08-28 ENCOUNTER — ANESTHESIA (OUTPATIENT)
Dept: ENDOSCOPY | Facility: OTHER | Age: 60
End: 2020-08-28
Payer: COMMERCIAL

## 2020-08-28 ENCOUNTER — ANESTHESIA EVENT (OUTPATIENT)
Dept: ENDOSCOPY | Facility: OTHER | Age: 60
End: 2020-08-28
Payer: COMMERCIAL

## 2020-08-28 ENCOUNTER — HOSPITAL ENCOUNTER (OUTPATIENT)
Facility: OTHER | Age: 60
Discharge: HOME | End: 2020-08-28
Attending: INTERNAL MEDICINE | Admitting: INTERNAL MEDICINE
Payer: COMMERCIAL

## 2020-08-28 VITALS
OXYGEN SATURATION: 98 % | DIASTOLIC BLOOD PRESSURE: 77 MMHG | TEMPERATURE: 98 F | HEART RATE: 72 BPM | RESPIRATION RATE: 16 BRPM | SYSTOLIC BLOOD PRESSURE: 126 MMHG

## 2020-08-28 DIAGNOSIS — Z12.11 COLON CANCER SCREENING: ICD-10-CM

## 2020-08-28 DIAGNOSIS — Z01.818 PRE-OP TESTING: ICD-10-CM

## 2020-08-28 PROCEDURE — 37000008 HC ANESTHESIA 1ST 15 MINUTES: Performed by: INTERNAL MEDICINE

## 2020-08-28 PROCEDURE — 63600175 PHARM REV CODE 636 W HCPCS: Performed by: ANESTHESIOLOGY

## 2020-08-28 PROCEDURE — 88305 TISSUE EXAM BY PATHOLOGIST: ICD-10-PCS | Mod: 26,,, | Performed by: PATHOLOGY

## 2020-08-28 PROCEDURE — 37000009 HC ANESTHESIA EA ADD 15 MINS: Performed by: INTERNAL MEDICINE

## 2020-08-28 PROCEDURE — 88305 TISSUE EXAM BY PATHOLOGIST: CPT | Mod: 26,,, | Performed by: PATHOLOGY

## 2020-08-28 PROCEDURE — 45385 COLONOSCOPY W/LESION REMOVAL: CPT | Performed by: INTERNAL MEDICINE

## 2020-08-28 PROCEDURE — 88305 TISSUE EXAM BY PATHOLOGIST: CPT | Performed by: PATHOLOGY

## 2020-08-28 PROCEDURE — 63600175 PHARM REV CODE 636 W HCPCS: Performed by: NURSE ANESTHETIST, CERTIFIED REGISTERED

## 2020-08-28 PROCEDURE — 25000003 PHARM REV CODE 250: Performed by: NURSE ANESTHETIST, CERTIFIED REGISTERED

## 2020-08-28 RX ORDER — ONDANSETRON 2 MG/ML
4 INJECTION INTRAMUSCULAR; INTRAVENOUS DAILY PRN
Status: DISCONTINUED | OUTPATIENT
Start: 2020-08-28 | End: 2020-08-28 | Stop reason: HOSPADM

## 2020-08-28 RX ORDER — MEPERIDINE HYDROCHLORIDE 25 MG/ML
12.5 INJECTION INTRAMUSCULAR; INTRAVENOUS; SUBCUTANEOUS ONCE AS NEEDED
Status: DISCONTINUED | OUTPATIENT
Start: 2020-08-28 | End: 2020-08-28 | Stop reason: HOSPADM

## 2020-08-28 RX ORDER — HYDROMORPHONE HYDROCHLORIDE 2 MG/ML
0.4 INJECTION, SOLUTION INTRAMUSCULAR; INTRAVENOUS; SUBCUTANEOUS EVERY 5 MIN PRN
Status: DISCONTINUED | OUTPATIENT
Start: 2020-08-28 | End: 2020-08-28 | Stop reason: HOSPADM

## 2020-08-28 RX ORDER — SODIUM CHLORIDE 0.9 % (FLUSH) 0.9 %
3 SYRINGE (ML) INJECTION
Status: DISCONTINUED | OUTPATIENT
Start: 2020-08-28 | End: 2020-08-28 | Stop reason: HOSPADM

## 2020-08-28 RX ORDER — PHENYLEPHRINE HYDROCHLORIDE 10 MG/ML
INJECTION INTRAVENOUS
Status: DISCONTINUED | OUTPATIENT
Start: 2020-08-28 | End: 2020-08-28

## 2020-08-28 RX ORDER — OXYCODONE HYDROCHLORIDE 5 MG/1
5 TABLET ORAL
Status: DISCONTINUED | OUTPATIENT
Start: 2020-08-28 | End: 2020-08-28 | Stop reason: HOSPADM

## 2020-08-28 RX ORDER — PROPOFOL 10 MG/ML
VIAL (ML) INTRAVENOUS
Status: DISCONTINUED | OUTPATIENT
Start: 2020-08-28 | End: 2020-08-28

## 2020-08-28 RX ORDER — FENTANYL CITRATE 50 UG/ML
INJECTION, SOLUTION INTRAMUSCULAR; INTRAVENOUS
Status: DISCONTINUED | OUTPATIENT
Start: 2020-08-28 | End: 2020-08-28

## 2020-08-28 RX ORDER — DIPHENHYDRAMINE HYDROCHLORIDE 50 MG/ML
25 INJECTION INTRAMUSCULAR; INTRAVENOUS EVERY 6 HOURS PRN
Status: DISCONTINUED | OUTPATIENT
Start: 2020-08-28 | End: 2020-08-28 | Stop reason: HOSPADM

## 2020-08-28 RX ORDER — LIDOCAINE HYDROCHLORIDE 20 MG/ML
INJECTION INTRAVENOUS
Status: DISCONTINUED | OUTPATIENT
Start: 2020-08-28 | End: 2020-08-28

## 2020-08-28 RX ORDER — GLYCOPYRROLATE 0.2 MG/ML
INJECTION INTRAMUSCULAR; INTRAVENOUS
Status: DISCONTINUED | OUTPATIENT
Start: 2020-08-28 | End: 2020-08-28

## 2020-08-28 RX ORDER — SODIUM CHLORIDE, SODIUM LACTATE, POTASSIUM CHLORIDE, CALCIUM CHLORIDE 600; 310; 30; 20 MG/100ML; MG/100ML; MG/100ML; MG/100ML
INJECTION, SOLUTION INTRAVENOUS CONTINUOUS PRN
Status: DISCONTINUED | OUTPATIENT
Start: 2020-08-28 | End: 2020-08-28

## 2020-08-28 RX ADMIN — PHENYLEPHRINE HYDROCHLORIDE 100 MCG: 10 INJECTION INTRAVENOUS at 08:08

## 2020-08-28 RX ADMIN — PROPOFOL 20 MG: 10 INJECTION, EMULSION INTRAVENOUS at 08:08

## 2020-08-28 RX ADMIN — FENTANYL CITRATE 100 MCG: 50 INJECTION, SOLUTION INTRAMUSCULAR; INTRAVENOUS at 07:08

## 2020-08-28 RX ADMIN — GLYCOPYRROLATE 0.2 MG: 0.2 INJECTION, SOLUTION INTRAMUSCULAR; INTRAVENOUS at 08:08

## 2020-08-28 RX ADMIN — LIDOCAINE HYDROCHLORIDE 50 MG: 20 INJECTION, SOLUTION INTRAVENOUS at 07:08

## 2020-08-28 RX ADMIN — SODIUM CHLORIDE, SODIUM LACTATE, POTASSIUM CHLORIDE, AND CALCIUM CHLORIDE: 600; 310; 30; 20 INJECTION, SOLUTION INTRAVENOUS at 07:08

## 2020-08-28 RX ADMIN — PROPOFOL 40 MG: 10 INJECTION, EMULSION INTRAVENOUS at 07:08

## 2020-08-28 RX ADMIN — PROPOFOL 20 MG: 10 INJECTION, EMULSION INTRAVENOUS at 07:08

## 2020-08-28 RX ADMIN — PHENYLEPHRINE HYDROCHLORIDE 200 MCG: 10 INJECTION INTRAVENOUS at 08:08

## 2020-08-28 NOTE — PLAN OF CARE
Griffin Valenzuela has met all discharge criteria from Phase II. Vital Signs are stable, ambulating  without difficulty. Discharge instructions given, patient verbalized understanding. Discharged from facility via wheelchair in stable condition.

## 2020-08-28 NOTE — H&P
[facility]  Gastroenterology  H&P    Patient Name: Griffin Valenzuela  MRN: [id number]  Admission Date: 8/28/2020  Code Status: Full Code    Attending Provider: Dwayne Vela MD   Primary Care Physician: Dwayne Grace Jr, MD  Principal Problem:<principal problem not specified>    Subjective:     History of Present Illness:  60-year-old man here for screening colonoscopy.  His last colonoscopy was 10 years ago and there were no polyps.  He is essentially asymptomatic from a GI standpoint.    Past Medical History:   Diagnosis Date    Hepatitis A     serology positive    History of MRSA infection     Recurring    HIV positive 2008    Hypercholesteremia 2011    Hyperglycemia     Hypertension     LFTs abnormal     Mild    Scoliosis     With occasional back pain    Sleep apnea     Non compliant with CPAP       Past Surgical History:   Procedure Laterality Date    COLONOSCOPY  08/2010    ELBOW FRACTURE SURGERY  1968    HEMORRHOID SURGERY N/A 1980    TONSILLECTOMY  1964       Review of patient's allergies indicates:  No Known Allergies  Family History     Problem Relation (Age of Onset)    Cancer Maternal Grandmother, Maternal Grandfather    Hyperlipidemia Brother    Osteoarthritis Mother    Stroke Father (86)        Tobacco Use    Smoking status: Never Smoker    Smokeless tobacco: Never Used   Substance and Sexual Activity    Alcohol use: Yes     Alcohol/week: 10.0 standard drinks     Types: 12 drink(s) per week    Drug use: No    Sexual activity: Not on file     Review of Systems   Constitutional: Negative for chills and fever.   Respiratory: Negative for cough and shortness of breath.    Cardiovascular: Negative for chest pain.   Gastrointestinal: Negative for abdominal pain.     Objective:     Vital Signs (Most Recent):  Temp: 97.3 °F (36.3 °C) (08/28/20 0711)  Pulse: 76 (08/28/20 0711)  Resp: 16 (08/28/20 0711)  BP: (!) 162/99 (08/28/20 0711)  SpO2: 95 % (08/28/20 0711) Vital Signs (24h  Range):  Temp:  [97.3 °F (36.3 °C)] 97.3 °F (36.3 °C)  Pulse:  [76] 76  Resp:  [16] 16  SpO2:  [95 %] 95 %  BP: (162)/(99) 162/99        There is no height or weight on file to calculate BMI.    No intake or output data in the 24 hours ending 08/28/20 0714    Lines/Drains/Airways     None                 Physical Exam  Constitutional:       General: He is not in acute distress.  Cardiovascular:      Rate and Rhythm: Normal rate and regular rhythm.   Pulmonary:      Effort: Pulmonary effort is normal.      Breath sounds: Normal breath sounds.   Abdominal:      General: There is no distension.      Palpations: Abdomen is soft.      Tenderness: There is no abdominal tenderness.   Neurological:      Mental Status: He is alert and oriented to person, place, and time.   Psychiatric:         Behavior: Behavior normal.                 Assessment/Plan:     Active Diagnoses:    Diagnosis Date Noted POA    Colon cancer screening [Z12.11] 08/28/2020 Not Applicable      Problems Resolved During this Admission:       Assessment:  Average risk of colon cancer here for colon screening.     Plan:  Screening colonoscopy        Dwayne Vela MD  Gastroenterology  [facility]-[address]

## 2020-08-28 NOTE — DISCHARGE INSTRUCTIONS
Anesthesia: Monitored Anesthesia Care (MAC)    Anesthesia Safety  · Have an adult family member or friend drive you home after the procedure.  · For the first 24 hours after your surgery:  ¨ Do not drive or use heavy equipment.  ¨ Do not make important decisions or sign documents.  ¨ Avoid alcohol.  ¨ Have someone stay with you, if possible. They can watch for problems and help keep you safe.    Please follow any additional instructions from Dr. Vela

## 2020-08-28 NOTE — ANESTHESIA PREPROCEDURE EVALUATION
08/28/2020  Griffin Valenzuela is a 60 y.o., male.    Anesthesia Evaluation    I have reviewed the Patient Summary Reports.    I have reviewed the Nursing Notes.    I have reviewed the Medications.     Review of Systems  Anesthesia Hx:  No problems with previous Anesthesia  Denies Family Hx of Anesthesia complications.   Denies Personal Hx of Anesthesia complications.   Social:  Non-Smoker    Hematology/Oncology:  Hematology Normal   Oncology Normal     EENT/Dental:EENT/Dental Normal   Cardiovascular:   Hypertension    Pulmonary:  Pulmonary Normal    Renal/:  Renal/ Normal     Hepatic/GI:   Hepatitis    Musculoskeletal:  Musculoskeletal Normal    Neurological:  Neurology Normal    Endocrine:  Endocrine Normal    Dermatological:  Skin Normal    Psych:  Psychiatric Normal           Physical Exam  General:  Well nourished    Airway/Jaw/Neck:  Airway Findings: Mouth Opening: Normal Mallampati: II      Dental:  Dental Findings: In tact        Mental Status:  Mental Status Findings:  Cooperative, Alert and Oriented         Anesthesia Plan  Type of Anesthesia, risks & benefits discussed:  Anesthesia Type:  general  Patient's Preference:   Intra-op Monitoring Plan: standard ASA monitors  Intra-op Monitoring Plan Comments:   Post Op Pain Control Plan:   Post Op Pain Control Plan Comments:   Induction:   IV  Beta Blocker:         Informed Consent: Patient understands risks and agrees with Anesthesia plan.  Questions answered. Anesthesia consent signed with patient.  ASA Score: 2     Day of Surgery Review of History & Physical:    H&P update referred to the surgeon.         Ready For Surgery From Anesthesia Perspective.

## 2020-08-28 NOTE — ANESTHESIA POSTPROCEDURE EVALUATION
Anesthesia Post Evaluation    Patient: Griffin Valenzuela    Procedure(s) Performed: Procedure(s) (LRB):  COLONOSCOPY (N/A)    Final Anesthesia Type: general    Patient location during evaluation: [facility]  Patient participation: Yes- Able to Participate  Level of consciousness: awake, oriented and awake and alert  Post-procedure vital signs: reviewed and stable  Pain management: adequate  Airway patency: patent    PONV status at discharge: No PONV  Anesthetic complications: no      Cardiovascular status: stable  Respiratory status: unassisted  Hydration status: euvolemic  Follow-up not needed.          Vitals Value Taken Time   /99 08/28/20 0711   Temp 36.3 °C (97.3 °F) 08/28/20 0711   Pulse 76 08/28/20 0711   Resp 16 08/28/20 0711   SpO2 95 % 08/28/20 0711         No case tracking events are documented in the log.      Pain/Kell Score: No data recorded

## 2020-08-28 NOTE — [employer] PATIENT INSTRUCTIONS
Discharge Summary/Instructions after an Endoscopic Procedure  Patient Name: Griffin Valenzuela  Patient MRN: [id number]  Patient YOB: 1960 Friday, August 28, 2020  Dwayne Vela MD  RESTRICTIONS:  During your procedure today, you received medications for sedation.  These   medications may affect your judgment, balance and coordination.  Therefore,   for 24 hours, you have the following restrictions:   - DO NOT drive a car, operate machinery, make legal/financial decisions,   sign important papers or drink alcohol.    ACTIVITY:  Today: no heavy lifting, straining or running due to procedural   sedation/anesthesia.  The following day: return to full activity including work.  DIET:  Eat and drink normally unless instructed otherwise.     TREATMENT FOR COMMON SIDE EFFECTS:  - Mild abdominal pain, nausea, belching, bloating or excessive gas:  rest,   eat lightly and use a heating pad.  - Sore Throat: treat with throat lozenges and/or gargle with warm salt   water.  - Because air was used during the procedure, expelling large amounts of air   from your rectum or belching is normal.  - If a bowel prep was taken, you may not have a bowel movement for 1-3 days.    This is normal.  SYMPTOMS TO WATCH FOR AND REPORT TO YOUR PHYSICIAN:  1. Abdominal pain or bloating, other than gas cramps.  2. Chest pain.  3. Back pain.  4. Signs of infection such as: chills or fever occurring within 24 hours   after the procedure.  5. Rectal bleeding, which would show as bright red, maroon, or black stools.   (A tablespoon of blood from the rectum is not serious, especially if   hemorrhoids are present.)  6. Vomiting.  7. Weakness or dizziness.  GO DIRECTLY TO THE NEAREST EMERGENCY ROOM IF YOU HAVE ANY OF THE FOLLOWING:      Difficulty breathing              Chills and/or fever over 101 F   Persistent vomiting and/or vomiting blood   Severe abdominal pain   Severe chest pain   Black, tarry stools   Bleeding- more than one tablespoon   Any  other symptom or condition that you feel may need urgent attention  Your doctor recommends these additional instructions:  If any biopsies were taken, your doctors clinic will contact you in 1 to 2   weeks with any results.  - Discharge patient to home.   - Resume previous diet.   - Continue present medications.   - Await pathology results.   - Repeat colonoscopy in 3 years for surveillance.   - Return to GI office PRN.  For questions, problems or results please call your physician - Dwayne Vela MD at Work:  [phone].  [facility], EMERGENCY ROOM PHONE NUMBER: [phone], [facility]   [phone].  IF A COMPLICATION OR EMERGENCY SITUATION ARISES AND YOU ARE UNABLE TO REACH   YOUR PHYSICIAN - GO DIRECTLY TO THE EMERGENCY ROOM.  Dwayne Vela MD  8/28/2020 8:28:05 AM  This report has been verified and signed electronically.  [employer]

## 2020-09-01 LAB
FINAL PATHOLOGIC DIAGNOSIS: NORMAL
GROSS: NORMAL

## 2020-11-30 ENCOUNTER — TELEPHONE (OUTPATIENT)
Dept: INFECTIOUS DISEASES | Facility: CLINIC | Age: 60
End: 2020-11-30

## 2020-11-30 DIAGNOSIS — Z79.899 ENCOUNTER FOR LONG-TERM (CURRENT) USE OF MEDICATIONS: ICD-10-CM

## 2020-11-30 DIAGNOSIS — M85.80 OSTEOPENIA, UNSPECIFIED LOCATION: ICD-10-CM

## 2020-11-30 DIAGNOSIS — R79.89 LFTS ABNORMAL: ICD-10-CM

## 2020-11-30 DIAGNOSIS — Z12.11 COLON CANCER SCREENING: ICD-10-CM

## 2020-11-30 DIAGNOSIS — E78.00 HYPERCHOLESTEREMIA: ICD-10-CM

## 2020-11-30 DIAGNOSIS — Z21 HIV POSITIVE: Primary | ICD-10-CM

## 2020-11-30 DIAGNOSIS — I10 HYPERTENSION, UNSPECIFIED TYPE: ICD-10-CM

## 2020-11-30 DIAGNOSIS — G47.33 OSA (OBSTRUCTIVE SLEEP APNEA): ICD-10-CM

## 2020-11-30 DIAGNOSIS — R73.03 PRE-DIABETES: ICD-10-CM

## 2020-11-30 NOTE — TELEPHONE ENCOUNTER
Has an upcoming appointment on 12/17/2020.  Would like to get labs done prior to his appointment.  [facility].    CELESTINE Kerr

## 2020-12-10 ENCOUNTER — LAB VISIT (OUTPATIENT)
Dept: LAB | Facility: HOSPITAL | Age: 60
End: 2020-12-10
Attending: INTERNAL MEDICINE
Payer: COMMERCIAL

## 2020-12-10 DIAGNOSIS — Z21 HIV POSITIVE: ICD-10-CM

## 2020-12-10 PROCEDURE — 86480 TB TEST CELL IMMUN MEASURE: CPT

## 2020-12-14 LAB
GAMMA INTERFERON BACKGROUND BLD IA-ACNC: 0.04 IU/ML
M TB IFN-G CD4+ BCKGRND COR BLD-ACNC: -0.01 IU/ML
MITOGEN IGNF BCKGRD COR BLD-ACNC: 9.45 IU/ML
TB GOLD PLUS: NEGATIVE
TB2 - NIL: 0 IU/ML

## 2020-12-17 ENCOUNTER — OFFICE VISIT (OUTPATIENT)
Dept: INFECTIOUS DISEASES | Facility: CLINIC | Age: 60
End: 2020-12-17
Payer: COMMERCIAL

## 2020-12-17 VITALS
HEART RATE: 77 BPM | OXYGEN SATURATION: 96 % | TEMPERATURE: 99 F | BODY MASS INDEX: 27.36 KG/M2 | WEIGHT: 202 LBS | DIASTOLIC BLOOD PRESSURE: 79 MMHG | HEIGHT: 72 IN | SYSTOLIC BLOOD PRESSURE: 113 MMHG

## 2020-12-17 DIAGNOSIS — Z79.899 ENCOUNTER FOR LONG-TERM (CURRENT) USE OF MEDICATIONS: ICD-10-CM

## 2020-12-17 DIAGNOSIS — Z21 HIV POSITIVE: Primary | ICD-10-CM

## 2020-12-17 DIAGNOSIS — R73.03 PRE-DIABETES: ICD-10-CM

## 2020-12-17 DIAGNOSIS — M85.80 OSTEOPENIA, UNSPECIFIED LOCATION: ICD-10-CM

## 2020-12-17 DIAGNOSIS — E78.00 HYPERCHOLESTEREMIA: ICD-10-CM

## 2020-12-17 DIAGNOSIS — Z12.5 SCREENING FOR PROSTATE CANCER: ICD-10-CM

## 2020-12-17 DIAGNOSIS — R79.89 LFTS ABNORMAL: ICD-10-CM

## 2020-12-17 DIAGNOSIS — G47.33 OSA (OBSTRUCTIVE SLEEP APNEA): ICD-10-CM

## 2020-12-17 PROCEDURE — 3074F PR MOST RECENT SYSTOLIC BLOOD PRESSURE < 130 MM HG: ICD-10-PCS | Mod: S$GLB,,, | Performed by: INTERNAL MEDICINE

## 2020-12-17 PROCEDURE — 3074F SYST BP LT 130 MM HG: CPT | Mod: S$GLB,,, | Performed by: INTERNAL MEDICINE

## 2020-12-17 PROCEDURE — 1126F PR PAIN SEVERITY QUANTIFIED, NO PAIN PRESENT: ICD-10-PCS | Mod: S$GLB,,, | Performed by: INTERNAL MEDICINE

## 2020-12-17 PROCEDURE — 3078F DIAST BP <80 MM HG: CPT | Mod: S$GLB,,, | Performed by: INTERNAL MEDICINE

## 2020-12-17 PROCEDURE — 1126F AMNT PAIN NOTED NONE PRSNT: CPT | Mod: S$GLB,,, | Performed by: INTERNAL MEDICINE

## 2020-12-17 PROCEDURE — 99214 PR OFFICE/OUTPT VISIT, EST, LEVL IV, 30-39 MIN: ICD-10-PCS | Mod: S$GLB,,, | Performed by: INTERNAL MEDICINE

## 2020-12-17 PROCEDURE — 99214 OFFICE O/P EST MOD 30 MIN: CPT | Mod: S$GLB,,, | Performed by: INTERNAL MEDICINE

## 2020-12-17 PROCEDURE — 3078F PR MOST RECENT DIASTOLIC BLOOD PRESSURE < 80 MM HG: ICD-10-PCS | Mod: S$GLB,,, | Performed by: INTERNAL MEDICINE

## 2020-12-17 RX ORDER — HYDROCODONE BITARTRATE AND ACETAMINOPHEN 5; 325 MG/1; MG/1
1 TABLET ORAL EVERY 6 HOURS PRN
Qty: 28 TABLET | Refills: 0 | Status: SHIPPED | OUTPATIENT
Start: 2020-12-17 | End: 2021-11-23 | Stop reason: SDUPTHER

## 2020-12-17 RX ORDER — ATAZANAVIR 200 MG/1
200 CAPSULE ORAL
Qty: 30 CAPSULE | Refills: 5 | Status: SHIPPED | OUTPATIENT
Start: 2020-12-17 | End: 2021-06-24 | Stop reason: SDUPTHER

## 2020-12-17 RX ORDER — LISINOPRIL 10 MG/1
10 TABLET ORAL DAILY
Qty: 30 TABLET | Refills: 11 | Status: SHIPPED | OUTPATIENT
Start: 2020-12-17 | End: 2021-06-24 | Stop reason: SDUPTHER

## 2020-12-17 RX ORDER — ABACAVIR AND LAMIVUDINE 600; 300 MG/1; MG/1
1 TABLET, FILM COATED ORAL DAILY
Qty: 30 TABLET | Refills: 6 | Status: SHIPPED | OUTPATIENT
Start: 2020-12-17 | End: 2021-06-24 | Stop reason: SDUPTHER

## 2020-12-17 RX ORDER — ROSUVASTATIN CALCIUM 10 MG/1
10 TABLET, COATED ORAL DAILY
Qty: 30 TABLET | Refills: 11 | Status: SHIPPED | OUTPATIENT
Start: 2020-12-17 | End: 2021-06-24 | Stop reason: SDUPTHER

## 2020-12-17 NOTE — PROGRESS NOTES
Subjective:       Patient ID: Griffin Valenzuela is a 60 y.o. male.    Chief Complaint:: HIV Positive/AIDS      12/7/19: Drinking again , same as before, 4 times a week, 4 drinks per night. Stress at work.   Visited [place] in NOvember  Did have his flu vaccine, and at least one shingrix([employer])  No visits to PCP since last  No labs prior to this visit  Had an URI last week with fever, runny eyes and nose, cough. Rested and it resolved.     6/14/19: only complaint is a flare of his back pain. He has plenty of old hydrocodone to use. Encouraged him to use NSAID as well. His BP is always high after driving from [place]. Repeat was 140/94. His outpatient blood pressures are largely 125/80 (his record sent to us, measured by nurse at work). He did receive the second shingles vaccine. His bone density was improved compared to 2012. He was abstinent from alcohol for 4 months and resumed drinking. Encouraged moderation, because of lipids and BS. Reviewed lab, lipids are better. VL is detected less than 40, same as last summer. Adherence is excellent.     12/20/19:  He is under a great deal of stress because he is looking for new job, [place] is extra stressful and he just had to drive here from [place].  His blood pressure is always elevated when he is here but he blames it on the stress of the drive.  He has not been measuring his blood pressure as often and today it is just too high to ignore.  He rides his bike almost every day and is very fit, has No cardiovascular symptoms.  He is drinking more than usual because of the stress and he knows it is not healthy.  His liver enzymes are slightly elevated as is the bilirubin and his blood sugar is a bit higher is consistent with pre diabetes.  Viral load is less than 40 but detected and he reports missing his medication approximately twice per month.  He takes it in the morning with food and sometimes does not have time for breakfast.  He is also in a hurry today.    1/31/20:  here for a blood pressure check and the measurements are much improved on lisinopril 10 mg and abstinence from alcohol. Not really being mindful about food carbs? Stress from [place] has resolved. BP good even though he drove here(which usually elevates BP a lot). Reviewed lab and his VL is now undetectable. He had interruption of epzicom for 4 days.     6/18/20: usual HIV follow up. He is having flare up of back pain. Taking hydrocodone without relief. Took ibuprofen OTC in the past, but not lately. He has been sleeping on his stomach a little more lately which may be a factor. He does a little stretching when this occurs. Last visit with an orthopedist for his back was 1990ish. No recognized muscle spasm and no pain in the leg. He associates this with scoliosis.   Reviewed labs and bilirubin 2.1. He has not had any alcohol since mardi gras. Negative abd US 3 years ago. Has been riding his bike 2x a week, since he is working from home. He has been protecting himself from COVID. He is limiting white carbs. Discussed A1c . Lipids much better. He saw ENt about the spot on his palate and she is observing it with recheck(Dr. Kylah cardona)     12/17/20: blood pressure is normal!!  Has had f/u with ENT 10/2020 with no change and f/u 1 year. Still exercising on weekend. Still abstinent from alcohol.!  Did receive a flu shot.     Current Outpatient Medications:     atazanavir (REYATAZ) 200 MG Cap, Take 1 capsule (200 mg total) by mouth daily with breakfast., Disp: 30 capsule, Rfl: 5    calcium-vitamin D3 (CALCIUM 500 + D) 500 mg(1,250mg) -200 unit per tablet, Take 1 tablet by mouth 2 (two) times daily with meals., Disp: , Rfl:     fish oil-omega-3 fatty acids 300-1,000 mg capsule, Take 2 capsules by mouth once daily., Disp: , Rfl:     HYDROcodone-acetaminophen (NORCO) 5-325 mg per tablet, Take 1 tablet by mouth every 6 (six) hours as needed for Pain., Disp: 28 tablet, Rfl: 0    rosuvastatin (CRESTOR) 10 MG  tablet, Take 1 tablet (10 mg total) by mouth once daily., Disp: 30 tablet, Rfl: 11    abacavir-lamivudine (EPZICOM) 600-300 mg per tablet, Take 1 tablet by mouth once daily., Disp: 30 tablet, Rfl: 6    FLUARIX QUAD 2709-9177, PF, 60 mcg (15 mcg x 4)/0.5 mL Syrg vaccine, ADM 0.5ML IM UTD, Disp: , Rfl: 0    lisinopriL 10 MG tablet, Take 1 tablet (10 mg total) by mouth once daily., Disp: 30 tablet, Rfl: 11    SHINGRIX, PF, 50 mcg/0.5 mL injection, ADM 0.5ML IM UTD, Disp: , Rfl: 0     Review of patient's allergies indicates:  No Known Allergies  Past Medical History:   Diagnosis Date    Hepatitis A     serology positive    History of MRSA infection     Recurring    HIV positive 2008    Hypercholesteremia 2011    Hyperglycemia     Hypertension     LFTs abnormal     Mild    Scoliosis     With occasional back pain    Sleep apnea     Non compliant with CPAP     Past Surgical History:   Procedure Laterality Date    COLONOSCOPY  08/2010    COLONOSCOPY N/A 8/28/2020    Procedure: COLONOSCOPY;  Surgeon: Dwayne Vela MD;  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   Social Needs    Financial resource strain: Not on file    Food insecurity     Worry: Not on file     Inability: Not on file    Transportation needs     Medical: Not on file     Non-medical: Not on file   Tobacco Use    Smoking status: Never Smoker    Smokeless tobacco: Never Used   Substance and Sexual Activity    Alcohol use: Yes     Alcohol/week: 10.0 standard drinks     Types: 12 drink(s) per week    Drug use: No    Sexual activity:  Not on file   Lifestyle    Physical activity     Days per week: Not on file     Minutes per session: Not on file    Stress: Not on file   Relationships    Social connections     Talks on phone: Not on file     Gets together: Not on file     Attends Congregational service: Not on file     Active member of club or organization: Not on file     Attends meetings of clubs or organizations: Not on file     Relationship status: Not on file   Other Topics Concern    Not on file   Social History Narrative    The patient does exercise regularly (bikes 6x/week).  Rates diet as good.  He is not satisfied with weight.         Family History   Problem Relation Age of Onset    Osteoarthritis Mother     Stroke Father 86    Hyperlipidemia Brother         Tinnitus    Cancer Maternal Grandmother         lung    Cancer Maternal Grandfather         lung       Travel History: [place], [place] of [place], [place], [place], [place], [place], [place]  Vaccine History: yearly flu vaccine, MMR 2011, pneumovax 2008, prevnar 2014, eIPV 2011, typhoid 2011, 2014, HBV 1,2,3 and booster 2015, TdaP 2008, menactra 2017,    zostavax 2017, shingrix 2018 x2  Advanced Directive:   Safer Sex: abstinent  Bone Density: 8/2012 osteopenia, taking calcium plus D, 1/2019 improved  Colonoscopy: 8/2020(Dr. Vela, 4 polyps)    Review of Systems    Constitutional: No fever, chills, sweats, fatigue, weakness, weight loss    Eyes: No change in vision, loss of vision, diplopia, photophobia. UTD eye exam    ENT:  Mild allergic rhinitis/seasonal allergies. No purulent sinus drainage, sore throat, mouth pain, or lesions. UTD dental exam, . Had to have a filling replaced  Cardiovascular: No chest pain, AGRAWAL, palpitations or pedal edema.      Respiratory: No shortness of breath, AGRAWAL,      Gastrointestinal: No abdominal pain, nausea, vomiting, diarrhea,  Blood in stool    Genitourinary: No dysuria, hematuria, nocturia x1    Musculoskeletal: see HPI,  back as  intermittent flare ups    Integumentary: no new skin lesions or wounds    Neurological: No dizziness, vertigo, unusual headaches, neuropathy,    Psychiatric: No   memory loss, no new sleep disturbance .  No alcohol since mardi gras!    Endocrine:  Thyroid normal    Lymphatic: No lymphadenopathy, blood loss, anemia, or malignancy    Objective:      Blood pressure 113/79, pulse 77, temperature 98.5 °F (36.9 °C), temperature source Temporal, height 6' (1.829 m), weight 91.6 kg (202 lb), SpO2 96 %. Body mass index is 27.4 kg/m².  Physical Exam       General: Alert and attentive, cooperative and in no distress    Eyes: Pupils equal, round, reactive to light, anicteric, EOMI    Neck: Supple, non-tender, no thyromegaly or masses    ENT: EAC with decreased impactions of dry cerumen,  nares patent,  teeth in good condition, no thrush. There was a 4 mm patch of white with rough surface in the posterior left soft palate. Non tender, . since June 2019 and has not changed 1/31/20.  Nor June 2020 nor December 2020 1/31/20 6/18/20 1/31/20: lesion unchanged in size. 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    Cardiovascular: Regular rate and rhythm, no murmurs, rubs, or gallop    Respiratory: Lungs clear without wheezes, no rales, rub or rhonci    Gastrointestinal:  Soft and non tender, BS pos, no mass or organomegaly    Genitourinary: no flank tenderness    Vascular: No peripheral edema, phlebitis, warm and well perfused    Musculoskeletal: Ambulates without difficulty, no acute arthritis . Normal strength, negative SLR bilaterally.  Integumentary:          Believes these spots started when he injured his back on the faucet of the bathtub 20 years ago.  He is noted lately that the become more scaly.  They are nontender and nonpruritic he denies that there have ever been vesicles there but it is very  difficult placed for him to see.  His phone cannot take photograph, 2006 version flip phone.      AnusRectum: slightly decreased tone, prostate 10g no nodules. No external lesions June 2019, EDDIE 8/2020 with c/s    Neurological: Normal LOC, cranial nerves, speech,  normal gait.  Reflexes are normal    Psychiatric: Normal mood, speech, demeanor    Lymphatic:   No cervical, supraclavicular, axillary or inguinal nodes     Wound:     HIV Table:   HLA-B 5701  negative   TOXOIgG     RPR 12/2020 non reactive   HEP A IgG 2/5/2013 pos, vaccinated   HBsAg     HBsAb 2/5/2016 positive, vax + booster   HBcAb     HBeAb     HBeAg     HCVAb 1/5/2018 negative   HBV DNA     HCV RNA     Vitamin D 1/5/2018 normal,32   Chlamydia / GC     TB Gold  12/2020   Negative: IPPD yearly negative 2018  PSA    12/2019  1.2      Recent Diagnostics: Reviewed lab        Assessment and Plan:           HIV positive  -     CBC Auto Differential; Future; Expected date: 05/03/2021  -     Comprehensive Metabolic Panel; Future; Expected date: 05/03/2021  -     HIV RNA, Quantitative, PCR; Future; Expected date: 05/03/2021  -     Urinalysis; Future; Expected date: 05/03/2021  -     atazanavir (REYATAZ) 200 MG Cap; Take 1 capsule (200 mg total) by mouth daily with breakfast.  Dispense: 30 capsule; Refill: 5  -     rosuvastatin (CRESTOR) 10 MG tablet; Take 1 tablet (10 mg total) by mouth once daily.  Dispense: 30 tablet; Refill: 11    Hypercholesteremia  -     Lipid Panel; Future; Expected date: 05/03/2021    LFTs abnormal  -     Hepatitis C Antibody; Future; Expected date: 05/03/2021    Screening for prostate cancer  -     PSA, Screening; Future; Expected date: 05/03/2021    Pre-diabetes  -     Hemoglobin A1C; Future; Expected date: 05/03/2021    Osteopenia, unspecified location    SHELBIE (obstructive sleep apnea)    Encounter for long-term (current) use of medications    Other orders  -     Cancel: (In Office Administered) Meningococcal Conjugate - MCV4P  (MENACTRA)  -     HYDROcodone-acetaminophen (NORCO) 5-325 mg per tablet; Take 1 tablet by mouth every 6 (six) hours as needed for Pain.  Dispense: 28 tablet; Refill: 0  -     abacavir-lamivudine (EPZICOM) 600-300 mg per tablet; Take 1 tablet by mouth once daily.  Dispense: 30 tablet; Refill: 6  -     lisinopriL 10 MG tablet; Take 1 tablet (10 mg total) by mouth once daily.  Dispense: 30 tablet; Refill: 11     Refill norco  Refill epzicom, reyataz, lisinopril, rosuvastatin    Lab orders in for May  Follow up late May  Continue to be prudent with carbs    Continue to protect yourself from COVID  If you get COVID, let me know, as you are a candidate for the monoclonal antibody infusion. ([facility])     Menactra #2 next time      This note was created using Dragon voice recognition software that occasionally misinterpreted phrases or words.

## 2020-12-17 NOTE — PATIENT INSTRUCTIONS
Refill norco  Refill epzicom, reyataz, lisinopril, rosuvastatin    Lab orders in for May  Follow up late May  Continue to be prudent with carbs    Continue to protect yourself from COVID  If you get COVID, let me know, as you are a candidate for the monoclonal antibody infusion. ([facility])

## 2021-02-18 ENCOUNTER — OFFICE VISIT (OUTPATIENT)
Dept: URGENT CARE | Facility: CLINIC | Age: 61
End: 2021-02-18
Payer: COMMERCIAL

## 2021-02-18 VITALS
TEMPERATURE: 98 F | DIASTOLIC BLOOD PRESSURE: 71 MMHG | HEIGHT: 72 IN | WEIGHT: 200 LBS | BODY MASS INDEX: 27.09 KG/M2 | SYSTOLIC BLOOD PRESSURE: 108 MMHG | HEART RATE: 99 BPM | OXYGEN SATURATION: 96 %

## 2021-02-18 DIAGNOSIS — S09.90XA TRAUMATIC INJURY OF HEAD, INITIAL ENCOUNTER: Primary | ICD-10-CM

## 2021-02-18 DIAGNOSIS — S01.81XA LACERATION OF EYEBROW AND FOREHEAD, LEFT, INITIAL ENCOUNTER: ICD-10-CM

## 2021-02-18 DIAGNOSIS — S01.112A LACERATION OF EYEBROW AND FOREHEAD, LEFT, INITIAL ENCOUNTER: ICD-10-CM

## 2021-02-18 PROCEDURE — 12013 LACERATION REPAIR: ICD-10-PCS | Mod: S$GLB,,, | Performed by: FAMILY MEDICINE

## 2021-02-18 PROCEDURE — 99214 PR OFFICE/OUTPT VISIT, EST, LEVL IV, 30-39 MIN: ICD-10-PCS | Mod: 25,S$GLB,, | Performed by: FAMILY MEDICINE

## 2021-02-18 PROCEDURE — 90471 IMMUNIZATION ADMIN: CPT | Mod: S$GLB,,, | Performed by: FAMILY MEDICINE

## 2021-02-18 PROCEDURE — 3008F BODY MASS INDEX DOCD: CPT | Mod: CPTII,S$GLB,, | Performed by: FAMILY MEDICINE

## 2021-02-18 PROCEDURE — 90715 TDAP VACCINE GREATER THAN OR EQUAL TO 7YO IM: ICD-10-PCS | Mod: S$GLB,,, | Performed by: FAMILY MEDICINE

## 2021-02-18 PROCEDURE — 70260 X-RAY EXAM OF SKULL: CPT | Mod: S$GLB,,, | Performed by: RADIOLOGY

## 2021-02-18 PROCEDURE — 3008F PR BODY MASS INDEX (BMI) DOCUMENTED: ICD-10-PCS | Mod: CPTII,S$GLB,, | Performed by: FAMILY MEDICINE

## 2021-02-18 PROCEDURE — 90715 TDAP VACCINE 7 YRS/> IM: CPT | Mod: S$GLB,,, | Performed by: FAMILY MEDICINE

## 2021-02-18 PROCEDURE — 90471 TDAP VACCINE GREATER THAN OR EQUAL TO 7YO IM: ICD-10-PCS | Mod: S$GLB,,, | Performed by: FAMILY MEDICINE

## 2021-02-18 PROCEDURE — 99214 OFFICE O/P EST MOD 30 MIN: CPT | Mod: 25,S$GLB,, | Performed by: FAMILY MEDICINE

## 2021-02-18 PROCEDURE — 12013 RPR F/E/E/N/L/M 2.6-5.0 CM: CPT | Mod: S$GLB,,, | Performed by: FAMILY MEDICINE

## 2021-02-18 PROCEDURE — 70260 XR SKULL COMPLETE MIN 4 VIEWS: ICD-10-PCS | Mod: S$GLB,,, | Performed by: RADIOLOGY

## 2021-06-15 ENCOUNTER — LAB VISIT (OUTPATIENT)
Dept: LAB | Facility: HOSPITAL | Age: 61
End: 2021-06-15
Attending: FAMILY MEDICINE
Payer: COMMERCIAL

## 2021-06-15 DIAGNOSIS — R79.89 LFTS ABNORMAL: ICD-10-CM

## 2021-06-15 DIAGNOSIS — E78.00 HYPERCHOLESTEREMIA: ICD-10-CM

## 2021-06-15 DIAGNOSIS — Z21 HIV POSITIVE: ICD-10-CM

## 2021-06-15 DIAGNOSIS — R73.03 PRE-DIABETES: ICD-10-CM

## 2021-06-15 DIAGNOSIS — Z12.5 SCREENING FOR PROSTATE CANCER: ICD-10-CM

## 2021-06-15 LAB
ALBUMIN SERPL BCP-MCNC: 4.2 G/DL (ref 3.5–5.2)
ALP SERPL-CCNC: 73 U/L (ref 55–135)
ALT SERPL W/O P-5'-P-CCNC: 34 U/L (ref 10–44)
ANION GAP SERPL CALC-SCNC: 11 MMOL/L (ref 8–16)
AST SERPL-CCNC: 29 U/L (ref 10–40)
BASOPHILS # BLD AUTO: 0.05 K/UL (ref 0–0.2)
BASOPHILS NFR BLD: 0.8 % (ref 0–1.9)
BILIRUB SERPL-MCNC: 1.8 MG/DL (ref 0.1–1)
BUN SERPL-MCNC: 13 MG/DL (ref 8–23)
CALCIUM SERPL-MCNC: 10 MG/DL (ref 8.7–10.5)
CHLORIDE SERPL-SCNC: 105 MMOL/L (ref 95–110)
CHOLEST SERPL-MCNC: 128 MG/DL (ref 120–199)
CHOLEST/HDLC SERPL: 3.1 {RATIO} (ref 2–5)
CO2 SERPL-SCNC: 22 MMOL/L (ref 23–29)
COMPLEXED PSA SERPL-MCNC: 1.4 NG/ML (ref 0–4)
CREAT SERPL-MCNC: 1.2 MG/DL (ref 0.5–1.4)
DIFFERENTIAL METHOD: ABNORMAL
EOSINOPHIL # BLD AUTO: 0.4 K/UL (ref 0–0.5)
EOSINOPHIL NFR BLD: 7.4 % (ref 0–8)
ERYTHROCYTE [DISTWIDTH] IN BLOOD BY AUTOMATED COUNT: 13.4 % (ref 11.5–14.5)
EST. GFR  (AFRICAN AMERICAN): >60 ML/MIN/1.73 M^2
EST. GFR  (NON AFRICAN AMERICAN): >60 ML/MIN/1.73 M^2
ESTIMATED AVG GLUCOSE: 143 MG/DL (ref 68–131)
GLUCOSE SERPL-MCNC: 113 MG/DL (ref 70–110)
HBA1C MFR BLD: 6.6 % (ref 4–5.6)
HCT VFR BLD AUTO: 45.5 % (ref 40–54)
HDLC SERPL-MCNC: 41 MG/DL (ref 40–75)
HDLC SERPL: 32 % (ref 20–50)
HGB BLD-MCNC: 15.1 G/DL (ref 14–18)
IMM GRANULOCYTES # BLD AUTO: 0.02 K/UL (ref 0–0.04)
IMM GRANULOCYTES NFR BLD AUTO: 0.3 % (ref 0–0.5)
LDLC SERPL CALC-MCNC: 69.6 MG/DL (ref 63–159)
LYMPHOCYTES # BLD AUTO: 2.5 K/UL (ref 1–4.8)
LYMPHOCYTES NFR BLD: 41.6 % (ref 18–48)
MCH RBC QN AUTO: 31.1 PG (ref 27–31)
MCHC RBC AUTO-ENTMCNC: 33.2 G/DL (ref 32–36)
MCV RBC AUTO: 94 FL (ref 82–98)
MONOCYTES # BLD AUTO: 0.7 K/UL (ref 0.3–1)
MONOCYTES NFR BLD: 11 % (ref 4–15)
NEUTROPHILS # BLD AUTO: 2.3 K/UL (ref 1.8–7.7)
NEUTROPHILS NFR BLD: 38.9 % (ref 38–73)
NONHDLC SERPL-MCNC: 87 MG/DL
NRBC BLD-RTO: 0 /100 WBC
PLATELET # BLD AUTO: 257 K/UL (ref 150–450)
PMV BLD AUTO: 9.8 FL (ref 9.2–12.9)
POTASSIUM SERPL-SCNC: 4.3 MMOL/L (ref 3.5–5.1)
PROT SERPL-MCNC: 7.8 G/DL (ref 6–8.4)
RBC # BLD AUTO: 4.86 M/UL (ref 4.6–6.2)
SODIUM SERPL-SCNC: 138 MMOL/L (ref 136–145)
TRIGL SERPL-MCNC: 87 MG/DL (ref 30–150)
WBC # BLD AUTO: 5.98 K/UL (ref 3.9–12.7)

## 2021-06-15 PROCEDURE — 83036 HEMOGLOBIN GLYCOSYLATED A1C: CPT | Performed by: INTERNAL MEDICINE

## 2021-06-15 PROCEDURE — 85025 COMPLETE CBC W/AUTO DIFF WBC: CPT | Performed by: INTERNAL MEDICINE

## 2021-06-15 PROCEDURE — 80061 LIPID PANEL: CPT | Performed by: INTERNAL MEDICINE

## 2021-06-15 PROCEDURE — 87536 HIV-1 QUANT&REVRSE TRNSCRPJ: CPT | Performed by: INTERNAL MEDICINE

## 2021-06-15 PROCEDURE — 80053 COMPREHEN METABOLIC PANEL: CPT | Performed by: INTERNAL MEDICINE

## 2021-06-15 PROCEDURE — 84153 ASSAY OF PSA TOTAL: CPT | Performed by: INTERNAL MEDICINE

## 2021-06-15 PROCEDURE — 86803 HEPATITIS C AB TEST: CPT | Performed by: INTERNAL MEDICINE

## 2021-06-16 LAB
HCV AB SERPL QL IA: NEGATIVE
HIV1 RNA # PLAS NAA DL=20: NORMAL COPIES/ML

## 2021-06-24 ENCOUNTER — OFFICE VISIT (OUTPATIENT)
Dept: INFECTIOUS DISEASES | Facility: CLINIC | Age: 61
End: 2021-06-24
Payer: COMMERCIAL

## 2021-06-24 VITALS
BODY MASS INDEX: 27.09 KG/M2 | TEMPERATURE: 98 F | HEIGHT: 72 IN | HEART RATE: 87 BPM | DIASTOLIC BLOOD PRESSURE: 81 MMHG | OXYGEN SATURATION: 98 % | WEIGHT: 200 LBS | SYSTOLIC BLOOD PRESSURE: 119 MMHG

## 2021-06-24 DIAGNOSIS — Z79.899 ENCOUNTER FOR LONG-TERM (CURRENT) USE OF MEDICATIONS: ICD-10-CM

## 2021-06-24 DIAGNOSIS — K13.21 LEUKOPLAKIA OF ORAL CAVITY: ICD-10-CM

## 2021-06-24 DIAGNOSIS — R73.9 HYPERGLYCEMIA: ICD-10-CM

## 2021-06-24 DIAGNOSIS — Z23 ENCOUNTER FOR IMMUNIZATION: ICD-10-CM

## 2021-06-24 DIAGNOSIS — Z21 HIV POSITIVE: Primary | ICD-10-CM

## 2021-06-24 DIAGNOSIS — R73.03 PRE-DIABETES: ICD-10-CM

## 2021-06-24 DIAGNOSIS — I10 HYPERTENSION, UNSPECIFIED TYPE: Primary | ICD-10-CM

## 2021-06-24 DIAGNOSIS — Z21 HIV POSITIVE: ICD-10-CM

## 2021-06-24 DIAGNOSIS — I10 HYPERTENSION, UNSPECIFIED TYPE: ICD-10-CM

## 2021-06-24 PROCEDURE — 3079F PR MOST RECENT DIASTOLIC BLOOD PRESSURE 80-89 MM HG: ICD-10-PCS | Mod: S$GLB,ICN,, | Performed by: INTERNAL MEDICINE

## 2021-06-24 PROCEDURE — 90471 IMMUNIZATION ADMIN: CPT | Mod: S$GLB,ICN,, | Performed by: INTERNAL MEDICINE

## 2021-06-24 PROCEDURE — 1126F PR PAIN SEVERITY QUANTIFIED, NO PAIN PRESENT: ICD-10-PCS | Mod: S$GLB,ICN,, | Performed by: INTERNAL MEDICINE

## 2021-06-24 PROCEDURE — 3074F PR MOST RECENT SYSTOLIC BLOOD PRESSURE < 130 MM HG: ICD-10-PCS | Mod: S$GLB,ICN,, | Performed by: INTERNAL MEDICINE

## 2021-06-24 PROCEDURE — 99214 PR OFFICE/OUTPT VISIT, EST, LEVL IV, 30-39 MIN: ICD-10-PCS | Mod: 25,S$GLB,ICN, | Performed by: INTERNAL MEDICINE

## 2021-06-24 PROCEDURE — 90471 MENINGOCOCCAL CONJUGATE VACCINE 4-VALENT IM (MENACTRA): ICD-10-PCS | Mod: S$GLB,ICN,, | Performed by: INTERNAL MEDICINE

## 2021-06-24 PROCEDURE — 3079F DIAST BP 80-89 MM HG: CPT | Mod: S$GLB,ICN,, | Performed by: INTERNAL MEDICINE

## 2021-06-24 PROCEDURE — 90733 MPSV4 VACCINE SUBQ: CPT | Mod: S$GLB,ICN,, | Performed by: INTERNAL MEDICINE

## 2021-06-24 PROCEDURE — 90733 MENINGOCOCCAL CONJUGATE VACCINE 4-VALENT IM (MENACTRA): ICD-10-PCS | Mod: S$GLB,ICN,, | Performed by: INTERNAL MEDICINE

## 2021-06-24 PROCEDURE — 99214 OFFICE O/P EST MOD 30 MIN: CPT | Mod: 25,S$GLB,ICN, | Performed by: INTERNAL MEDICINE

## 2021-06-24 PROCEDURE — 1126F AMNT PAIN NOTED NONE PRSNT: CPT | Mod: S$GLB,ICN,, | Performed by: INTERNAL MEDICINE

## 2021-06-24 PROCEDURE — 3074F SYST BP LT 130 MM HG: CPT | Mod: S$GLB,ICN,, | Performed by: INTERNAL MEDICINE

## 2021-06-24 RX ORDER — ROSUVASTATIN CALCIUM 10 MG/1
10 TABLET, COATED ORAL DAILY
Qty: 30 TABLET | Refills: 11 | Status: SHIPPED | OUTPATIENT
Start: 2021-06-24 | End: 2022-05-30 | Stop reason: SDUPTHER

## 2021-06-24 RX ORDER — LISINOPRIL 10 MG/1
10 TABLET ORAL DAILY
Qty: 30 TABLET | Refills: 11 | Status: SHIPPED | OUTPATIENT
Start: 2021-06-24 | End: 2022-05-30 | Stop reason: SDUPTHER

## 2021-06-24 RX ORDER — ATAZANAVIR 200 MG/1
200 CAPSULE ORAL
Qty: 30 CAPSULE | Refills: 5 | Status: SHIPPED | OUTPATIENT
Start: 2021-06-24 | End: 2022-05-30 | Stop reason: SDUPTHER

## 2021-06-24 RX ORDER — ABACAVIR AND LAMIVUDINE 600; 300 MG/1; MG/1
1 TABLET, FILM COATED ORAL DAILY
Qty: 30 TABLET | Refills: 6 | Status: SHIPPED | OUTPATIENT
Start: 2021-06-24 | End: 2022-05-30 | Stop reason: SDUPTHER

## 2021-10-27 ENCOUNTER — TELEPHONE (OUTPATIENT)
Dept: INFECTIOUS DISEASES | Facility: CLINIC | Age: 61
End: 2021-10-27
Payer: COMMERCIAL

## 2021-11-18 ENCOUNTER — TELEPHONE (OUTPATIENT)
Dept: INFECTIOUS DISEASES | Facility: CLINIC | Age: 61
End: 2021-11-18
Payer: COMMERCIAL

## 2021-11-18 DIAGNOSIS — R73.03 PRE-DIABETES: ICD-10-CM

## 2021-11-18 DIAGNOSIS — Z21 HIV POSITIVE: Primary | ICD-10-CM

## 2021-11-23 ENCOUNTER — TELEPHONE (OUTPATIENT)
Dept: INFECTIOUS DISEASES | Facility: CLINIC | Age: 61
End: 2021-11-23
Payer: COMMERCIAL

## 2021-11-23 DIAGNOSIS — M41.9 SCOLIOSIS, UNSPECIFIED SCOLIOSIS TYPE, UNSPECIFIED SPINAL REGION: Primary | ICD-10-CM

## 2021-11-23 RX ORDER — HYDROCODONE BITARTRATE AND ACETAMINOPHEN 5; 325 MG/1; MG/1
1 TABLET ORAL EVERY 6 HOURS PRN
Qty: 28 TABLET | Refills: 0 | Status: SHIPPED | OUTPATIENT
Start: 2021-11-23 | End: 2023-08-16 | Stop reason: SDUPTHER

## 2021-11-29 ENCOUNTER — LAB VISIT (OUTPATIENT)
Dept: LAB | Facility: HOSPITAL | Age: 61
End: 2021-11-29
Attending: INTERNAL MEDICINE
Payer: COMMERCIAL

## 2021-11-29 DIAGNOSIS — R73.03 PRE-DIABETES: ICD-10-CM

## 2021-11-29 DIAGNOSIS — Z21 HIV POSITIVE: ICD-10-CM

## 2021-11-29 LAB
ALBUMIN SERPL BCP-MCNC: 4 G/DL (ref 3.5–5.2)
ALP SERPL-CCNC: 55 U/L (ref 55–135)
ALT SERPL W/O P-5'-P-CCNC: 36 U/L (ref 10–44)
ANION GAP SERPL CALC-SCNC: 12 MMOL/L (ref 8–16)
AST SERPL-CCNC: 56 U/L (ref 10–40)
BASOPHILS # BLD AUTO: 0.06 K/UL (ref 0–0.2)
BASOPHILS NFR BLD: 0.8 % (ref 0–1.9)
BILIRUB SERPL-MCNC: 0.9 MG/DL (ref 0.1–1)
BUN SERPL-MCNC: 13 MG/DL (ref 8–23)
CALCIUM SERPL-MCNC: 9.9 MG/DL (ref 8.7–10.5)
CHLORIDE SERPL-SCNC: 103 MMOL/L (ref 95–110)
CHOLEST SERPL-MCNC: 127 MG/DL (ref 120–199)
CHOLEST/HDLC SERPL: 2.4 {RATIO} (ref 2–5)
CO2 SERPL-SCNC: 22 MMOL/L (ref 23–29)
CREAT SERPL-MCNC: 1.1 MG/DL (ref 0.5–1.4)
DIFFERENTIAL METHOD: ABNORMAL
EOSINOPHIL # BLD AUTO: 0.2 K/UL (ref 0–0.5)
EOSINOPHIL NFR BLD: 3.2 % (ref 0–8)
ERYTHROCYTE [DISTWIDTH] IN BLOOD BY AUTOMATED COUNT: 13.4 % (ref 11.5–14.5)
EST. GFR  (AFRICAN AMERICAN): >60 ML/MIN/1.73 M^2
EST. GFR  (NON AFRICAN AMERICAN): >60 ML/MIN/1.73 M^2
ESTIMATED AVG GLUCOSE: 131 MG/DL (ref 68–131)
GLUCOSE SERPL-MCNC: 96 MG/DL (ref 70–110)
HBA1C MFR BLD: 6.2 % (ref 4–5.6)
HCT VFR BLD AUTO: 46.9 % (ref 40–54)
HDLC SERPL-MCNC: 52 MG/DL (ref 40–75)
HDLC SERPL: 40.9 % (ref 20–50)
HGB BLD-MCNC: 15.4 G/DL (ref 14–18)
IMM GRANULOCYTES # BLD AUTO: 0.01 K/UL (ref 0–0.04)
IMM GRANULOCYTES NFR BLD AUTO: 0.1 % (ref 0–0.5)
LDLC SERPL CALC-MCNC: 63.2 MG/DL (ref 63–159)
LYMPHOCYTES # BLD AUTO: 1.9 K/UL (ref 1–4.8)
LYMPHOCYTES NFR BLD: 25.5 % (ref 18–48)
MCH RBC QN AUTO: 31.8 PG (ref 27–31)
MCHC RBC AUTO-ENTMCNC: 32.8 G/DL (ref 32–36)
MCV RBC AUTO: 97 FL (ref 82–98)
MONOCYTES # BLD AUTO: 0.9 K/UL (ref 0.3–1)
MONOCYTES NFR BLD: 11.8 % (ref 4–15)
NEUTROPHILS # BLD AUTO: 4.3 K/UL (ref 1.8–7.7)
NEUTROPHILS NFR BLD: 58.6 % (ref 38–73)
NONHDLC SERPL-MCNC: 75 MG/DL
NRBC BLD-RTO: 0 /100 WBC
PLATELET # BLD AUTO: 271 K/UL (ref 150–450)
PMV BLD AUTO: 9.6 FL (ref 9.2–12.9)
POTASSIUM SERPL-SCNC: 3.5 MMOL/L (ref 3.5–5.1)
PROT SERPL-MCNC: 7.8 G/DL (ref 6–8.4)
RBC # BLD AUTO: 4.85 M/UL (ref 4.6–6.2)
SODIUM SERPL-SCNC: 137 MMOL/L (ref 136–145)
TRIGL SERPL-MCNC: 59 MG/DL (ref 30–150)
WBC # BLD AUTO: 7.28 K/UL (ref 3.9–12.7)

## 2021-11-29 PROCEDURE — 80053 COMPREHEN METABOLIC PANEL: CPT | Performed by: INTERNAL MEDICINE

## 2021-11-29 PROCEDURE — 85025 COMPLETE CBC W/AUTO DIFF WBC: CPT | Performed by: INTERNAL MEDICINE

## 2021-11-29 PROCEDURE — 86361 T CELL ABSOLUTE COUNT: CPT | Performed by: INTERNAL MEDICINE

## 2021-11-29 PROCEDURE — 87536 HIV-1 QUANT&REVRSE TRNSCRPJ: CPT | Performed by: INTERNAL MEDICINE

## 2021-11-29 PROCEDURE — 83036 HEMOGLOBIN GLYCOSYLATED A1C: CPT | Performed by: INTERNAL MEDICINE

## 2021-11-29 PROCEDURE — 80061 LIPID PANEL: CPT | Performed by: INTERNAL MEDICINE

## 2021-11-29 PROCEDURE — 86592 SYPHILIS TEST NON-TREP QUAL: CPT | Performed by: INTERNAL MEDICINE

## 2021-11-30 LAB
CD3+CD4+ CELLS # BLD: 569 CELLS/UL (ref 300–1400)
CD3+CD4+ CELLS NFR BLD: 31.4 % (ref 28–57)
HIV1 RNA # PLAS NAA DL=20: NORMAL COPIES/ML
RPR SER QL: NORMAL

## 2021-12-01 ENCOUNTER — TELEPHONE (OUTPATIENT)
Dept: NEUROSURGERY | Facility: CLINIC | Age: 61
End: 2021-12-01
Payer: COMMERCIAL

## 2021-12-01 DIAGNOSIS — M54.50 LUMBAR SPINE PAIN: Primary | ICD-10-CM

## 2021-12-02 ENCOUNTER — OFFICE VISIT (OUTPATIENT)
Dept: NEUROSURGERY | Facility: CLINIC | Age: 61
End: 2021-12-02
Payer: COMMERCIAL

## 2021-12-02 ENCOUNTER — HOSPITAL ENCOUNTER (OUTPATIENT)
Dept: RADIOLOGY | Facility: HOSPITAL | Age: 61
Discharge: HOME | End: 2021-12-02
Attending: NURSE PRACTITIONER
Payer: COMMERCIAL

## 2021-12-02 VITALS — DIASTOLIC BLOOD PRESSURE: 67 MMHG | HEART RATE: 79 BPM | SYSTOLIC BLOOD PRESSURE: 107 MMHG

## 2021-12-02 DIAGNOSIS — M54.50 LUMBAR SPINE PAIN: ICD-10-CM

## 2021-12-02 DIAGNOSIS — M54.16 LUMBAR RADICULOPATHY: ICD-10-CM

## 2021-12-02 DIAGNOSIS — M54.50 LUMBAR SPINE PAIN: Primary | ICD-10-CM

## 2021-12-02 DIAGNOSIS — M41.9 SCOLIOSIS, UNSPECIFIED SCOLIOSIS TYPE, UNSPECIFIED SPINAL REGION: ICD-10-CM

## 2021-12-02 PROCEDURE — 72110 X-RAY EXAM L-2 SPINE 4/>VWS: CPT | Mod: 26,,, | Performed by: RADIOLOGY

## 2021-12-02 PROCEDURE — 99204 OFFICE O/P NEW MOD 45 MIN: CPT | Mod: S$GLB,,, | Performed by: NURSE PRACTITIONER

## 2021-12-02 PROCEDURE — 72110 X-RAY EXAM L-2 SPINE 4/>VWS: CPT | Mod: TC

## 2021-12-02 PROCEDURE — 4010F ACE/ARB THERAPY RXD/TAKEN: CPT | Mod: CPTII,S$GLB,, | Performed by: NURSE PRACTITIONER

## 2021-12-02 PROCEDURE — 72110 XR LUMBAR SPINE AP AND LAT WITH FLEX/EXT: ICD-10-PCS | Mod: 26,,, | Performed by: RADIOLOGY

## 2021-12-02 PROCEDURE — 99999 PR PBB SHADOW E&M-EST. PATIENT-LVL III: CPT | Mod: PBBFAC,,, | Performed by: NURSE PRACTITIONER

## 2021-12-02 PROCEDURE — 99999 PR PBB SHADOW E&M-EST. PATIENT-LVL III: ICD-10-PCS | Mod: PBBFAC,,, | Performed by: NURSE PRACTITIONER

## 2021-12-02 PROCEDURE — 99204 PR OFFICE/OUTPT VISIT, NEW, LEVL IV, 45-59 MIN: ICD-10-PCS | Mod: S$GLB,,, | Performed by: NURSE PRACTITIONER

## 2021-12-02 PROCEDURE — 4010F PR ACE/ARB THEARPY RXD/TAKEN: ICD-10-PCS | Mod: CPTII,S$GLB,, | Performed by: NURSE PRACTITIONER

## 2021-12-07 ENCOUNTER — TELEPHONE (OUTPATIENT)
Dept: NEUROSURGERY | Facility: CLINIC | Age: 61
End: 2021-12-07
Payer: COMMERCIAL

## 2021-12-15 ENCOUNTER — OFFICE VISIT (OUTPATIENT)
Dept: INFECTIOUS DISEASES | Facility: CLINIC | Age: 61
End: 2021-12-15
Payer: COMMERCIAL

## 2021-12-15 VITALS
HEART RATE: 89 BPM | SYSTOLIC BLOOD PRESSURE: 107 MMHG | TEMPERATURE: 98 F | OXYGEN SATURATION: 98 % | WEIGHT: 186.38 LBS | DIASTOLIC BLOOD PRESSURE: 76 MMHG | BODY MASS INDEX: 25.24 KG/M2 | HEIGHT: 72 IN

## 2021-12-15 DIAGNOSIS — Z79.899 ENCOUNTER FOR LONG-TERM (CURRENT) USE OF MEDICATIONS: ICD-10-CM

## 2021-12-15 DIAGNOSIS — R73.03 PRE-DIABETES: ICD-10-CM

## 2021-12-15 DIAGNOSIS — G89.29 CHRONIC LOW BACK PAIN WITH SCIATICA, SCIATICA LATERALITY UNSPECIFIED, UNSPECIFIED BACK PAIN LATERALITY: ICD-10-CM

## 2021-12-15 DIAGNOSIS — Z21 HIV POSITIVE: Primary | ICD-10-CM

## 2021-12-15 DIAGNOSIS — Z12.5 SCREENING FOR PROSTATE CANCER: ICD-10-CM

## 2021-12-15 DIAGNOSIS — Z71.89 EDUCATED ABOUT COVID-19 VIRUS INFECTION: ICD-10-CM

## 2021-12-15 DIAGNOSIS — E78.00 HYPERCHOLESTEREMIA: ICD-10-CM

## 2021-12-15 DIAGNOSIS — M54.40 CHRONIC LOW BACK PAIN WITH SCIATICA, SCIATICA LATERALITY UNSPECIFIED, UNSPECIFIED BACK PAIN LATERALITY: ICD-10-CM

## 2021-12-15 PROCEDURE — 99215 OFFICE O/P EST HI 40 MIN: CPT | Mod: 25,S$GLB,, | Performed by: INTERNAL MEDICINE

## 2021-12-15 PROCEDURE — 90471 IMMUNIZATION ADMIN: CPT | Mod: S$GLB,,, | Performed by: INTERNAL MEDICINE

## 2021-12-15 PROCEDURE — 90471 FLU VACCINE (QUAD) GREATER THAN OR EQUAL TO 3YO PRESERVATIVE FREE IM: ICD-10-PCS | Mod: S$GLB,,, | Performed by: INTERNAL MEDICINE

## 2021-12-15 PROCEDURE — 99215 PR OFFICE/OUTPT VISIT, EST, LEVL V, 40-54 MIN: ICD-10-PCS | Mod: 25,S$GLB,, | Performed by: INTERNAL MEDICINE

## 2021-12-15 PROCEDURE — 4010F ACE/ARB THERAPY RXD/TAKEN: CPT | Mod: S$GLB,,, | Performed by: INTERNAL MEDICINE

## 2021-12-15 PROCEDURE — 4010F PR ACE/ARB THEARPY RXD/TAKEN: ICD-10-PCS | Mod: S$GLB,,, | Performed by: INTERNAL MEDICINE

## 2021-12-15 PROCEDURE — 90686 IIV4 VACC NO PRSV 0.5 ML IM: CPT | Mod: S$GLB,,, | Performed by: INTERNAL MEDICINE

## 2021-12-15 PROCEDURE — 90686 FLU VACCINE (QUAD) GREATER THAN OR EQUAL TO 3YO PRESERVATIVE FREE IM: ICD-10-PCS | Mod: S$GLB,,, | Performed by: INTERNAL MEDICINE

## 2021-12-29 ENCOUNTER — CLINICAL SUPPORT (OUTPATIENT)
Dept: REHABILITATION | Facility: HOSPITAL | Age: 61
End: 2021-12-29
Payer: COMMERCIAL

## 2021-12-29 DIAGNOSIS — R29.3 POSTURE ABNORMALITY: ICD-10-CM

## 2021-12-29 DIAGNOSIS — M53.86 DECREASED RANGE OF MOTION OF LUMBAR SPINE: ICD-10-CM

## 2021-12-29 DIAGNOSIS — M54.50 LUMBAR SPINE PAIN: ICD-10-CM

## 2021-12-29 PROCEDURE — 97161 PT EVAL LOW COMPLEX 20 MIN: CPT | Mod: PO

## 2021-12-29 PROCEDURE — 97110 THERAPEUTIC EXERCISES: CPT | Mod: PO

## 2021-12-30 PROBLEM — R29.3 POSTURE ABNORMALITY: Status: ACTIVE | Noted: 2021-12-30

## 2021-12-30 PROBLEM — M53.86 DECREASED RANGE OF MOTION OF LUMBAR SPINE: Status: ACTIVE | Noted: 2021-12-30

## 2022-02-01 ENCOUNTER — OFFICE VISIT (OUTPATIENT)
Dept: NEUROSURGERY | Facility: CLINIC | Age: 62
End: 2022-02-01
Payer: COMMERCIAL

## 2022-02-01 VITALS — HEART RATE: 76 BPM | DIASTOLIC BLOOD PRESSURE: 70 MMHG | SYSTOLIC BLOOD PRESSURE: 107 MMHG

## 2022-02-01 DIAGNOSIS — M41.9 SCOLIOSIS, UNSPECIFIED SCOLIOSIS TYPE, UNSPECIFIED SPINAL REGION: Primary | ICD-10-CM

## 2022-02-01 DIAGNOSIS — M54.50 LUMBAR SPINE PAIN: ICD-10-CM

## 2022-02-01 PROCEDURE — 1159F MED LIST DOCD IN RCRD: CPT | Mod: CPTII,S$GLB,, | Performed by: NURSE PRACTITIONER

## 2022-02-01 PROCEDURE — 4010F PR ACE/ARB THEARPY RXD/TAKEN: ICD-10-PCS | Mod: CPTII,S$GLB,, | Performed by: NURSE PRACTITIONER

## 2022-02-01 PROCEDURE — 3078F PR MOST RECENT DIASTOLIC BLOOD PRESSURE < 80 MM HG: ICD-10-PCS | Mod: CPTII,S$GLB,, | Performed by: NURSE PRACTITIONER

## 2022-02-01 PROCEDURE — 99999 PR PBB SHADOW E&M-EST. PATIENT-LVL III: ICD-10-PCS | Mod: PBBFAC,,, | Performed by: NURSE PRACTITIONER

## 2022-02-01 PROCEDURE — 4010F ACE/ARB THERAPY RXD/TAKEN: CPT | Mod: CPTII,S$GLB,, | Performed by: NURSE PRACTITIONER

## 2022-02-01 PROCEDURE — 1160F PR REVIEW ALL MEDS BY PRESCRIBER/CLIN PHARMACIST DOCUMENTED: ICD-10-PCS | Mod: CPTII,S$GLB,, | Performed by: NURSE PRACTITIONER

## 2022-02-01 PROCEDURE — 1159F PR MEDICATION LIST DOCUMENTED IN MEDICAL RECORD: ICD-10-PCS | Mod: CPTII,S$GLB,, | Performed by: NURSE PRACTITIONER

## 2022-02-01 PROCEDURE — 3074F PR MOST RECENT SYSTOLIC BLOOD PRESSURE < 130 MM HG: ICD-10-PCS | Mod: CPTII,S$GLB,, | Performed by: NURSE PRACTITIONER

## 2022-02-01 PROCEDURE — 99213 PR OFFICE/OUTPT VISIT, EST, LEVL III, 20-29 MIN: ICD-10-PCS | Mod: S$GLB,,, | Performed by: NURSE PRACTITIONER

## 2022-02-01 PROCEDURE — 99999 PR PBB SHADOW E&M-EST. PATIENT-LVL III: CPT | Mod: PBBFAC,,, | Performed by: NURSE PRACTITIONER

## 2022-02-01 PROCEDURE — 1160F RVW MEDS BY RX/DR IN RCRD: CPT | Mod: CPTII,S$GLB,, | Performed by: NURSE PRACTITIONER

## 2022-02-01 PROCEDURE — 99213 OFFICE O/P EST LOW 20 MIN: CPT | Mod: S$GLB,,, | Performed by: NURSE PRACTITIONER

## 2022-02-01 PROCEDURE — 3074F SYST BP LT 130 MM HG: CPT | Mod: CPTII,S$GLB,, | Performed by: NURSE PRACTITIONER

## 2022-02-01 PROCEDURE — 3078F DIAST BP <80 MM HG: CPT | Mod: CPTII,S$GLB,, | Performed by: NURSE PRACTITIONER

## 2022-02-07 NOTE — PROGRESS NOTES
Neurosurgery  Established Patient    SUBJECTIVE:     History of Present Illness: Griffin Valenzuela is a 61 y.o. male with PMH of HIV, Hep. A, HTN, SHELBIE, and HLD. He is being seen in clinic today as a referral from ID to discuss concerns with worsening low back pain. States that he has struggled with chronic back pain for several years. He was diagnosed with scoliosis as an adolescent; no surgical intervention. Denies trauma.     Describes the pain as constant and aching with radiation across the low back extending into the left hip as well as radiation upward into the thoracic spine between the shoulder blades. Rates the pain as a 4/10 and left leg pain as a 1/10. Aggravating factors include sitting for prolonged periods. Alleviating factors include exercise, walking, and bicycling. Denies weakness, numbness or tingling, b/b dysfunction, saddle anesthesia, or gait instability. He has not obtained conservative therapy recently for his recent flare-up. The patient is concerned that the symptoms will continue to progress.      Interval Hx 2/1/22: After the last appointment, it was recommended that the patient obtain PT as well as additional imaging to evaluate his concerns. The patient participated in a session of PT with little improvement. States that he has obtained acupuncture and it has provided significant relief. The patient is no longer having the back pain that he was previously. Denies weakness, numbness or tingling, b/b dysfunction, saddle anesthesia, or gait instability.      Review of patient's allergies indicates:  No Known Allergies    Current Outpatient Medications   Medication Sig Dispense Refill    calcium-vitamin D3 (OS-ESTRELLA 500 + D3) 500 mg(1,250mg) -200 unit per tablet Take 1 tablet by mouth 2 (two) times daily with meals.      fish oil-omega-3 fatty acids 300-1,000 mg capsule Take 2 capsules by mouth once daily.      HYDROcodone-acetaminophen (NORCO) 5-325 mg per tablet Take 1 tablet by mouth every 6  (six) hours as needed for Pain. 28 tablet 0    lisinopriL 10 MG tablet Take 1 tablet (10 mg total) by mouth once daily. 30 tablet 11    rosuvastatin (CRESTOR) 10 MG tablet Take 1 tablet (10 mg total) by mouth once daily. 30 tablet 11    abacavir-lamivudine (EPZICOM) 600-300 mg per tablet Take 1 tablet by mouth once daily. 30 tablet 6    atazanavir (REYATAZ) 200 MG Cap Take 1 capsule (200 mg total) by mouth daily with breakfast. 30 capsule 5     No current facility-administered medications for this visit.       Past Medical History:   Diagnosis Date    Hepatitis A     serology positive    History of MRSA infection     Recurring    HIV positive 2008    Hypercholesteremia 2011    Hyperglycemia     Hypertension     LFTs abnormal     Mild    Scoliosis     With occasional back pain    Sleep apnea     Non compliant with CPAP     Past Surgical History:   Procedure Laterality Date    COLONOSCOPY  08/2010    COLONOSCOPY N/A 8/28/2020    Procedure: COLONOSCOPY;  Surgeon: Dwayne Vela MD;  [facility];  Service: Endoscopy;  Laterality: N/A;    ELBOW FRACTURE SURGERY  1968    HEMORRHOID SURGERY N/A 1980    TONSILLECTOMY  1964     Family History     Problem Relation (Age of Onset)    Cancer Maternal Grandmother, Maternal Grandfather    Hyperlipidemia Brother    Osteoarthritis Mother    Stroke Father (86)        Social History     Socioeconomic History    Marital status: Single   Occupational History    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  Rates diet as good.  He is not satisfied with weight.           Review of Systems   Constitutional: Negative for activity change,  appetite change and fever.   HENT: Negative for hearing loss and postnasal drip.    Eyes: Negative for pain and visual disturbance.   Respiratory: Negative for shortness of breath.    Cardiovascular: Negative for chest pain and palpitations.   Gastrointestinal: Negative for abdominal pain.   Endocrine: Negative for polydipsia, polyphagia and polyuria.   Genitourinary: Negative for difficulty urinating, frequency and urgency.   Musculoskeletal: Negative for back pain and gait problem.   Neurological: Negative for dizziness, weakness, numbness and headaches.   Psychiatric/Behavioral: Negative for confusion and dysphoric mood.       OBJECTIVE:     Vital Signs  Pulse: 76  BP: 107/70  Pain Score: 0-No pain  There is no height or weight on file to calculate BMI.    Neurosurgery Physical Exam  General: well developed, well nourished, no distress.   Head: normocephalic, atraumatic  Neurologic: Alert and oriented. Thought content appropriate.  GCS: Motor: 6/Verbal: 5/Eyes: 4 GCS Total: 15  Mental Status: Awake, Alert, Oriented x 4  Language: No aphasia  Speech: No dysarthria  Cranial nerves: face symmetric, tongue midline, CN II-XII grossly intact.   Eyes: pupils equal, round, reactive to light with accomodation, EOMI.   Pulmonary: normal respirations, no signs of respiratory distress  Abdomen: soft, non-distended  Skin: Skin is warm, dry and intact.  Sensory: intact to light touch throughout  Motor Strength:Moves all extremities spontaneously with good tone. No abnormal movements seen.     Strength  Deltoids Triceps Biceps Wrist Extension Wrist Flexion Hand    Upper: R 5/5 5/5 5/5 5/5 5/5 5/5    L 5/5 5/5 5/5 5/5 5/5 5/5     HF KE KF DF PF EHL   Lower: R 5/5 5/5 5/5 5/5 5/5 5/5    L 5/5 5/5 5/5 5/5 5/5 5/5     Reflexes:   DTR: 2+ symmetrically throughout.  Kennedy's: Negative.  Clonus: Negative.     Cerebellar:   Finger-to-nose: intact bilaterally   Pronator drift: absent bilaterally  Gait stable, fluid.      Cervical:    ROM: Full with flexion, extension, lateral rotation and ear-to-shoulder bend.   Midline TTP: Negative.     Thoracic:  Midline TTP: Negative.     Lumbar:  Midline TTP: Negative.    Diagnostic Results:  There is no new imaging to review for this encounter.     ASSESSMENT/PLAN:   Griffin Valenzuela is a 62 y.o. male with PMH of HIV, Hep. A, HTN, SHELBIE, and HLD. After the last appointment, it was recommended that the patient obtain PT as well as additional imaging to evaluate his concerns. The patient participated in a session of PT with little improvement. States that he has obtained acupuncture and it has provided significant relief. Since he denies pain currently, we will hold off on obtaining the discussed imaging. If his symptoms return then we can proceed the MRI and x-ray as discussed. He was agreeable.     I would like the patient to follow-up in clinic as needed. I have encouraged him to contact the clinic with any questions, concerns, or adverse clinical changes. He verbalized understanding.     VIRGILIO Cardoso-MARIE  Neurosurgery  [facility].     Note dictated with voice recognition software, please excuse any grammatical errors.

## 2022-05-25 ENCOUNTER — TELEPHONE (OUTPATIENT)
Dept: INFECTIOUS DISEASES | Facility: CLINIC | Age: 62
End: 2022-05-25

## 2022-05-25 DIAGNOSIS — R73.03 PRE-DIABETES: ICD-10-CM

## 2022-05-25 DIAGNOSIS — E78.00 HYPERCHOLESTEREMIA: ICD-10-CM

## 2022-05-25 DIAGNOSIS — Z21 HIV POSITIVE: Primary | ICD-10-CM

## 2022-05-25 NOTE — TELEPHONE ENCOUNTER
Patient called  [phone]    He is requesting lab orders be placed for [facility]    He has an appt on 5/30/22 with Dr Mann    Fax # for [phone]

## 2022-05-30 ENCOUNTER — LAB VISIT (OUTPATIENT)
Dept: LAB | Facility: HOSPITAL | Age: 62
End: 2022-05-30
Attending: INTERNAL MEDICINE
Payer: COMMERCIAL

## 2022-05-30 ENCOUNTER — OFFICE VISIT (OUTPATIENT)
Dept: INFECTIOUS DISEASES | Facility: CLINIC | Age: 62
End: 2022-05-30
Payer: COMMERCIAL

## 2022-05-30 VITALS
HEART RATE: 88 BPM | WEIGHT: 191 LBS | TEMPERATURE: 98 F | BODY MASS INDEX: 25.87 KG/M2 | OXYGEN SATURATION: 97 % | SYSTOLIC BLOOD PRESSURE: 118 MMHG | DIASTOLIC BLOOD PRESSURE: 70 MMHG | HEIGHT: 72 IN

## 2022-05-30 DIAGNOSIS — E78.00 HYPERCHOLESTEREMIA: ICD-10-CM

## 2022-05-30 DIAGNOSIS — Z21 HIV POSITIVE: Primary | ICD-10-CM

## 2022-05-30 DIAGNOSIS — Z21 HIV POSITIVE: ICD-10-CM

## 2022-05-30 DIAGNOSIS — R73.03 PRE-DIABETES: ICD-10-CM

## 2022-05-30 DIAGNOSIS — I10 HYPERTENSION, UNSPECIFIED TYPE: ICD-10-CM

## 2022-05-30 DIAGNOSIS — Z79.899 ENCOUNTER FOR LONG-TERM (CURRENT) USE OF MEDICATIONS: ICD-10-CM

## 2022-05-30 LAB
ALBUMIN SERPL BCP-MCNC: 4.1 G/DL (ref 3.5–5.2)
ALP SERPL-CCNC: 65 U/L (ref 55–135)
ALT SERPL W/O P-5'-P-CCNC: 24 U/L (ref 10–44)
ANION GAP SERPL CALC-SCNC: 7 MMOL/L (ref 8–16)
AST SERPL-CCNC: 23 U/L (ref 10–40)
BASOPHILS # BLD AUTO: 0.06 K/UL (ref 0–0.2)
BASOPHILS NFR BLD: 1 % (ref 0–1.9)
BILIRUB SERPL-MCNC: 2.1 MG/DL (ref 0.1–1)
BUN SERPL-MCNC: 12 MG/DL (ref 8–23)
CALCIUM SERPL-MCNC: 9.6 MG/DL (ref 8.7–10.5)
CHLORIDE SERPL-SCNC: 105 MMOL/L (ref 95–110)
CHOLEST SERPL-MCNC: 141 MG/DL (ref 120–199)
CHOLEST/HDLC SERPL: 3 {RATIO} (ref 2–5)
CO2 SERPL-SCNC: 26 MMOL/L (ref 23–29)
COMPLEXED PSA SERPL-MCNC: 3.7 NG/ML (ref 0–4)
CREAT SERPL-MCNC: 1 MG/DL (ref 0.5–1.4)
DIFFERENTIAL METHOD: ABNORMAL
EOSINOPHIL # BLD AUTO: 0.3 K/UL (ref 0–0.5)
EOSINOPHIL NFR BLD: 4.5 % (ref 0–8)
ERYTHROCYTE [DISTWIDTH] IN BLOOD BY AUTOMATED COUNT: 13.3 % (ref 11.5–14.5)
EST. GFR  (AFRICAN AMERICAN): >60 ML/MIN/1.73 M^2
EST. GFR  (NON AFRICAN AMERICAN): >60 ML/MIN/1.73 M^2
ESTIMATED AVG GLUCOSE: 137 MG/DL (ref 68–131)
GLUCOSE SERPL-MCNC: 115 MG/DL (ref 70–110)
HBA1C MFR BLD: 6.4 % (ref 4–5.6)
HCT VFR BLD AUTO: 44.7 % (ref 40–54)
HDLC SERPL-MCNC: 47 MG/DL (ref 40–75)
HDLC SERPL: 33.3 % (ref 20–50)
HGB BLD-MCNC: 14.5 G/DL (ref 14–18)
IMM GRANULOCYTES # BLD AUTO: 0.02 K/UL (ref 0–0.04)
IMM GRANULOCYTES NFR BLD AUTO: 0.3 % (ref 0–0.5)
LDLC SERPL CALC-MCNC: 74 MG/DL (ref 63–159)
LYMPHOCYTES # BLD AUTO: 2.1 K/UL (ref 1–4.8)
LYMPHOCYTES NFR BLD: 37 % (ref 18–48)
MCH RBC QN AUTO: 30.5 PG (ref 27–31)
MCHC RBC AUTO-ENTMCNC: 32.4 G/DL (ref 32–36)
MCV RBC AUTO: 94 FL (ref 82–98)
MONOCYTES # BLD AUTO: 0.6 K/UL (ref 0.3–1)
MONOCYTES NFR BLD: 10.8 % (ref 4–15)
NEUTROPHILS # BLD AUTO: 2.7 K/UL (ref 1.8–7.7)
NEUTROPHILS NFR BLD: 46.4 % (ref 38–73)
NONHDLC SERPL-MCNC: 94 MG/DL
NRBC BLD-RTO: 0 /100 WBC
PLATELET # BLD AUTO: 265 K/UL (ref 150–450)
PMV BLD AUTO: 9.1 FL (ref 9.2–12.9)
POTASSIUM SERPL-SCNC: 4.2 MMOL/L (ref 3.5–5.1)
PROT SERPL-MCNC: 7.5 G/DL (ref 6–8.4)
RBC # BLD AUTO: 4.76 M/UL (ref 4.6–6.2)
SODIUM SERPL-SCNC: 138 MMOL/L (ref 136–145)
TRIGL SERPL-MCNC: 100 MG/DL (ref 30–150)
WBC # BLD AUTO: 5.76 K/UL (ref 3.9–12.7)

## 2022-05-30 PROCEDURE — 3008F PR BODY MASS INDEX (BMI) DOCUMENTED: ICD-10-PCS | Mod: CPTII,S$GLB,, | Performed by: INTERNAL MEDICINE

## 2022-05-30 PROCEDURE — 80061 LIPID PANEL: CPT | Performed by: INTERNAL MEDICINE

## 2022-05-30 PROCEDURE — 85025 COMPLETE CBC W/AUTO DIFF WBC: CPT | Performed by: INTERNAL MEDICINE

## 2022-05-30 PROCEDURE — 99214 PR OFFICE/OUTPT VISIT, EST, LEVL IV, 30-39 MIN: ICD-10-PCS | Mod: S$GLB,,, | Performed by: INTERNAL MEDICINE

## 2022-05-30 PROCEDURE — 87536 HIV-1 QUANT&REVRSE TRNSCRPJ: CPT | Performed by: INTERNAL MEDICINE

## 2022-05-30 PROCEDURE — 83036 HEMOGLOBIN GLYCOSYLATED A1C: CPT | Performed by: INTERNAL MEDICINE

## 2022-05-30 PROCEDURE — 4010F PR ACE/ARB THEARPY RXD/TAKEN: ICD-10-PCS | Mod: CPTII,S$GLB,, | Performed by: INTERNAL MEDICINE

## 2022-05-30 PROCEDURE — 86706 HEP B SURFACE ANTIBODY: CPT | Performed by: INTERNAL MEDICINE

## 2022-05-30 PROCEDURE — 3008F BODY MASS INDEX DOCD: CPT | Mod: CPTII,S$GLB,, | Performed by: INTERNAL MEDICINE

## 2022-05-30 PROCEDURE — 3074F SYST BP LT 130 MM HG: CPT | Mod: CPTII,S$GLB,, | Performed by: INTERNAL MEDICINE

## 2022-05-30 PROCEDURE — 84153 ASSAY OF PSA TOTAL: CPT | Performed by: INTERNAL MEDICINE

## 2022-05-30 PROCEDURE — 3078F DIAST BP <80 MM HG: CPT | Mod: CPTII,S$GLB,, | Performed by: INTERNAL MEDICINE

## 2022-05-30 PROCEDURE — 4010F ACE/ARB THERAPY RXD/TAKEN: CPT | Mod: CPTII,S$GLB,, | Performed by: INTERNAL MEDICINE

## 2022-05-30 PROCEDURE — 36415 COLL VENOUS BLD VENIPUNCTURE: CPT | Mod: PO | Performed by: INTERNAL MEDICINE

## 2022-05-30 PROCEDURE — 3074F PR MOST RECENT SYSTOLIC BLOOD PRESSURE < 130 MM HG: ICD-10-PCS | Mod: CPTII,S$GLB,, | Performed by: INTERNAL MEDICINE

## 2022-05-30 PROCEDURE — 1159F PR MEDICATION LIST DOCUMENTED IN MEDICAL RECORD: ICD-10-PCS | Mod: CPTII,S$GLB,, | Performed by: INTERNAL MEDICINE

## 2022-05-30 PROCEDURE — 3078F PR MOST RECENT DIASTOLIC BLOOD PRESSURE < 80 MM HG: ICD-10-PCS | Mod: CPTII,S$GLB,, | Performed by: INTERNAL MEDICINE

## 2022-05-30 PROCEDURE — 3044F PR MOST RECENT HEMOGLOBIN A1C LEVEL <7.0%: ICD-10-PCS | Mod: CPTII,S$GLB,, | Performed by: INTERNAL MEDICINE

## 2022-05-30 PROCEDURE — 80053 COMPREHEN METABOLIC PANEL: CPT | Performed by: INTERNAL MEDICINE

## 2022-05-30 PROCEDURE — 86803 HEPATITIS C AB TEST: CPT | Performed by: INTERNAL MEDICINE

## 2022-05-30 PROCEDURE — 99214 OFFICE O/P EST MOD 30 MIN: CPT | Mod: S$GLB,,, | Performed by: INTERNAL MEDICINE

## 2022-05-30 PROCEDURE — 3044F HG A1C LEVEL LT 7.0%: CPT | Mod: CPTII,S$GLB,, | Performed by: INTERNAL MEDICINE

## 2022-05-30 PROCEDURE — 1159F MED LIST DOCD IN RCRD: CPT | Mod: CPTII,S$GLB,, | Performed by: INTERNAL MEDICINE

## 2022-05-30 RX ORDER — ABACAVIR AND LAMIVUDINE 600; 300 MG/1; MG/1
1 TABLET, FILM COATED ORAL DAILY
Qty: 30 TABLET | Refills: 6 | Status: SHIPPED | OUTPATIENT
Start: 2022-05-30 | End: 2023-04-03 | Stop reason: SDUPTHER

## 2022-05-30 RX ORDER — ATAZANAVIR 200 MG/1
200 CAPSULE ORAL
Qty: 30 CAPSULE | Refills: 5 | Status: SHIPPED | OUTPATIENT
Start: 2022-05-30 | End: 2022-08-02 | Stop reason: SDUPTHER

## 2022-05-30 RX ORDER — LISINOPRIL 10 MG/1
10 TABLET ORAL DAILY
Qty: 30 TABLET | Refills: 11 | Status: SHIPPED | OUTPATIENT
Start: 2022-05-30 | End: 2023-06-09 | Stop reason: SDUPTHER

## 2022-05-30 RX ORDER — ROSUVASTATIN CALCIUM 10 MG/1
10 TABLET, COATED ORAL DAILY
Qty: 30 TABLET | Refills: 11 | Status: SHIPPED | OUTPATIENT
Start: 2022-05-30 | End: 2023-06-09 | Stop reason: SDUPTHER

## 2022-05-30 NOTE — PROGRESS NOTES
Subjective:       Patient ID: Griffin Valenzuela is a 62 y.o. male.    Chief Complaint:: Follow-up and HIV Positive/AIDS      12/7/19: Drinking again , same as before, 4 times a week, 4 drinks per night. Stress at work.   Visited [place] in NOvember  Did have his flu vaccine, and at least one shingrix([employer])  No visits to PCP since last  No labs prior to this visit  Had an URI last week with fever, runny eyes and nose, cough. Rested and it resolved.     6/14/19: only complaint is a flare of his back pain. He has plenty of old hydrocodone to use. Encouraged him to use NSAID as well. His BP is always high after driving from [place]. Repeat was 140/94. His outpatient blood pressures are largely 125/80 (his record sent to us, measured by nurse at work). He did receive the second shingles vaccine. His bone density was improved compared to 2012. He was abstinent from alcohol for 4 months and resumed drinking. Encouraged moderation, because of lipids and BS. Reviewed lab, lipids are better. VL is detected less than 40, same as last summer. Adherence is excellent.     12/20/19:  He is under a great deal of stress because he is looking for new job, [place] is extra stressful and he just had to drive here from [place].  His blood pressure is always elevated when he is here but he blames it on the stress of the drive.  He has not been measuring his blood pressure as often and today it is just too high to ignore.  He rides his bike almost every day and is very fit, has No cardiovascular symptoms.  He is drinking more than usual because of the stress and he knows it is not healthy.  His liver enzymes are slightly elevated as is the bilirubin and his blood sugar is a bit higher is consistent with pre diabetes.  Viral load is less than 40 but detected and he reports missing his medication approximately twice per month.  He takes it in the morning with food and sometimes does not have time for breakfast.  He is also in a hurry  today.    1/31/20: here for a blood pressure check and the measurements are much improved on lisinopril 10 mg and abstinence from alcohol. Not really being mindful about food carbs? Stress from [place] has resolved. BP good even though he drove here(which usually elevates BP a lot). Reviewed lab and his VL is now undetectable. He had interruption of epzicom for 4 days.     6/18/20: usual HIV follow up. He is having flare up of back pain. Taking hydrocodone without relief. Took ibuprofen OTC in the past, but not lately. He has been sleeping on his stomach a little more lately which may be a factor. He does a little stretching when this occurs. Last visit with an orthopedist for his back was 1990ish. No recognized muscle spasm and no pain in the leg. He associates this with scoliosis.   Reviewed labs and bilirubin 2.1. He has not had any alcohol since mardi gras. Negative abd US 3 years ago. Has been riding his bike 2x a week, since he is working from home. He has been protecting himself from COVID. He is limiting white carbs. Discussed A1c . Lipids much better. He saw ENt about the spot on his palate and she is observing it with recheck(Dr. Kylah cardona)     12/17/20: blood pressure is normal!!  Has had f/u with ENT 10/2020 with no change and f/u 1 year. Still exercising on weekend. Still abstinent from alcohol.!  Did receive a flu shot.     6/24/21: still abstinent from alcohol. Had both COVID vaccines, BP perfect. Had a slip and head injury and received stitches. 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 Hem A1c is up to 6.6%, avg 143. He admits to cookies and chips and riding bike only twice a week.  He removed anticipates and improvement in his A1c by moving to this new job and he will make more of an effort to need  reduce unhealthy foods and does not want to start a medication yet    12/15/21: most recent flare of back pain prompted referral to Neurosurgery, who has  ordered an MRI and physical therapy. He has been awaiting the PT but in the interim he has had acupuncture with good results.  Not needing pain medication since then.  MRI is not been scheduled the was ordered on 12/02.  Still abstinent from alcohol.   ENT f/u states that mouth lesion is stable and yearly f/u. Went to the dentist 3 weeks ago.  Has lost 15 lb since last visit, likely some of which is intentional in some of which is loss of muscle mass from decreased activity due to back pain.    5/30/22: his job is ending in a week. He is considering his options. He feels good.       Current Outpatient Medications:     calcium-vitamin D3 (OS-ESTRELLA 500 + D3) 500 mg(1,250mg) -200 unit per tablet, Take 1 tablet by mouth 2 (two) times daily with meals., Disp: , Rfl:     fish oil-omega-3 fatty acids 300-1,000 mg capsule, Take 2 capsules by mouth once daily., Disp: , Rfl:     HYDROcodone-acetaminophen (NORCO) 5-325 mg per tablet, Take 1 tablet by mouth every 6 (six) hours as needed for Pain., Disp: 28 tablet, Rfl: 0    abacavir-lamivudine (EPZICOM) 600-300 mg per tablet, Take 1 tablet by mouth once daily., Disp: 30 tablet, Rfl: 6    atazanavir (REYATAZ) 200 MG Cap, Take 1 capsule (200 mg total) by mouth daily with breakfast., Disp: 30 capsule, Rfl: 5    lisinopriL 10 MG tablet, Take 1 tablet (10 mg total) by mouth once daily., Disp: 30 tablet, Rfl: 11    rosuvastatin (CRESTOR) 10 MG tablet, Take 1 tablet (10 mg total) by mouth once daily., Disp: 30 tablet, Rfl: 11     Review of patient's allergies indicates:  No Known Allergies  Past Medical History:   Diagnosis Date    Hepatitis A     serology positive    History of MRSA infection     Recurring    HIV positive 2008    Hypercholesteremia 2011    Hyperglycemia     Hypertension     LFTs abnormal     Mild    Scoliosis     With occasional back pain    Sleep apnea     Non compliant with CPAP     Past Surgical History:   Procedure Laterality Date    COLONOSCOPY   08/2010    COLONOSCOPY N/A 8/28/2020    Procedure: COLONOSCOPY;  Surgeon: Dwayne Vela MD;  Location: [facility];  Service: Endoscopy;  Laterality: N/A;    ELBOW FRACTURE SURGERY  1968    HEMORRHOID SURGERY N/A 1980    TONSILLECTOMY  1964     Social History     Socioeconomic History    Marital status: Single   Occupational History    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  Rates diet as good.  He is not satisfied with weight.         Family History   Problem Relation Age of Onset    Osteoarthritis Mother     Stroke Father 86    Hyperlipidemia Brother         Tinnitus    Cancer Maternal Grandmother         lung    Cancer Maternal Grandfather         lung       Travel History: [place], [place] of [place], [place], [place], [place], [place], [place]  Vaccine History: yearly flu vaccine, MMR 2011, pneumovax 2008, prevnar 2014, eIPV 2011, typhoid 2011, 2014, HBV 1,2,3 and booster 2015, TdaP 2008,2021,  menactra 2017 2021,    zostavax 2017, shingrix 2018 x2, COVID vax x 2 feb 2021.   Advanced Directive:   Safer Sex: abstinent  Bone Density: 8/2012 osteopenia, taking calcium plus D, 1/2019 improved  Colonoscopy: 8/2020(Dr. Vela, 4 polyps)  Due 2023    Review of Systems    Constitutional: No fever, chills, sweats, fatigue, weakness, weight loss    Eyes: No change in vision, loss of vision, diplopia, photophobia. UTD eye exam    ENT: chronic and mild allergic rhinitis/seasonal allergies. No purulent sinus drainage, sore throat, mouth pain, or lesions. UTD dental exam.  He usually sees ENT once a year for ear wax and stability of throat lesion  Cardiovascular: No chest pain,  AGRAWAL, palpitations or pedal edema.      Respiratory: No shortness of breath, AGRAWAL,      Gastrointestinal: No abdominal pain, nausea, vomiting, diarrhea,  Blood in stool    Genitourinary: No dysuria, hematuria, nocturia x1    Musculoskeletal: back is doing well. No other injuries, received acupuncture from a chinese medicine practitioner, every 4-5 weeks    Integumentary: no new skin lesions     Neurological: No dizziness, vertigo, unusual headaches, neuropathy,    Psychiatric: No   memory loss, no new sleep disturbance .  No alcohol since mardi gras 2020    Endocrine:  Thyroid normal. Monitoring his carb intake, less exercise  Lymphatic: No lymphadenopathy, blood loss, anemia, or malignancy    Objective:      Blood pressure 118/70, pulse 88, temperature 98.2 °F (36.8 °C), height 6' (1.829 m), weight 86.6 kg (191 lb), SpO2 97 %. Body mass index is 25.9 kg/m².  Physical Exam       General: Alert and attentive, cooperative and in no distress    Eyes: Pupils equal, round, reactive to light, anicteric, EOMI    Neck: Supple, non-tender, no thyromegaly or masses    ENT: EAC normal tympanic membranes,  nares patent,  teeth in good condition, no thrush. There was a 4 mm patch of white with rough surface in the posterior left soft palate. Non tender, . since June 2019 and has not changed 1/31/20.  Nor June 2020 nor December 2020 nor June 2021 nor December 2021 1/31/20 6/18/20 1/31/20: lesion unchanged in size. 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  06/24/2021, 12/15/:  No change    Cardiovascular: Regular rate and rhythm, no murmurs, rubs, or gallop    Respiratory: Lungs clear without wheezes, no rales, rub or rhonci    Gastrointestinal:  Soft and non tender, BS pos, no mass or organomegaly    Genitourinary: no flank tenderness    Vascular: No peripheral edema, phlebitis, warm and well  perfused    Musculoskeletal: Ambulates without difficulty, no acute arthritis . Normal strength, negative SLR bilaterally.  Integumentary:          Believes these spots started when he injured his back on the faucet of the bathtub 20 years ago.  He is noted lately that the become more scaly.  They are nontender and nonpruritic he denies that there have ever been vesicles there but it is very difficult placed for him to see.  His phone cannot take photograph, 2006 version flip phone.  6/24/21:  The spots above are healed and very subtle at this time  12/15/21.  No active lesions     AnusRectum: slightly decreased tone, prostate 10g no nodules. No external lesions 12/15/21    Neurological: Normal LOC, cranial nerves, speech,  normal gait.  Reflexes are normal    Psychiatric: Normal mood, speech, demeanor    Lymphatic:   No cervical, supraclavicular, axillary or inguinal nodes     Wound:     HIV Table:   HLA-B 5701  negative   TOXOIgG     RPR 11/29/21 non reactive   HEP A IgG 2/5/2013 pos, vaccinated   HBsAg     HBsAb 2/5/2016 positive, vax + booster   HBcAb     HBeAb     HBeAg     HCVAb 6/2020 negative   HBV DNA     HCV RNA     Vitamin D 1/5/2018 normal,32   Chlamydia / GC     TB Gold  12/2020   Negative: IPPD yearly negative 2018 of the fact  PSA    6/2021  1.4    CD4 count  11/2021 569  Hemoglobin A1c 11/2021 6.2%       Recent Diagnostics: Reviewed lab        Assessment and Plan:           HIV positive    Encounter for long-term (current) use of medications    Hypercholesteremia    Pre-diabetes    Hypertension, unspecified type        Same medications    Keep the carbs low and the exercise frequent    Follow up 6 months    This note was created using Dragon voice recognition software that occasionally misinterpreted phrases or words.

## 2022-05-31 LAB
HBV SURFACE AB SER-ACNC: POSITIVE M[IU]/ML
HCV AB SERPL QL IA: NEGATIVE

## 2022-06-01 LAB — HIV1 RNA # PLAS NAA DL=20: 20 COPIES/ML

## 2022-06-07 NOTE — PROGRESS NOTES
I spoke with patient to advise of lab results  To repeat viral load in 6 weeks   Encourage more exercise   And patient stated he does not have a PCP  ; per Dr Mann's orders     6/07/22

## 2022-07-22 ENCOUNTER — LAB VISIT (OUTPATIENT)
Dept: LAB | Facility: HOSPITAL | Age: 62
End: 2022-07-22
Attending: INTERNAL MEDICINE
Payer: MEDICAID

## 2022-07-22 DIAGNOSIS — Z21 HIV POSITIVE: ICD-10-CM

## 2022-07-22 PROCEDURE — 87536 HIV-1 QUANT&REVRSE TRNSCRPJ: CPT | Performed by: INTERNAL MEDICINE

## 2022-07-22 PROCEDURE — 36415 COLL VENOUS BLD VENIPUNCTURE: CPT | Mod: PO | Performed by: INTERNAL MEDICINE

## 2022-07-25 LAB — HIV1 RNA # PLAS NAA DL=20: 22 COPIES/ML

## 2022-08-02 ENCOUNTER — TELEPHONE (OUTPATIENT)
Dept: INFECTIOUS DISEASES | Facility: CLINIC | Age: 62
End: 2022-08-02

## 2022-08-02 DIAGNOSIS — Z21 HIV POSITIVE: ICD-10-CM

## 2022-08-02 RX ORDER — ATAZANAVIR 200 MG/1
400 CAPSULE ORAL
Qty: 60 CAPSULE | Refills: 5 | Status: SHIPPED | OUTPATIENT
Start: 2022-08-02 | End: 2023-02-01 | Stop reason: SDUPTHER

## 2022-08-02 NOTE — TELEPHONE ENCOUNTER
Patient called  [phone]    Patient states since May 2022  He has been receiving   # 30 of Atazanavir ( Reyataz ) and taking one tab QD     Patient states historically ( prior to May 2022) he was   Receiving # 60 of Atazanavir and taking 2 PO QD.    I confirmed with [facility] pharmacy ( [facility] )  (at ph # [phone]) that prior to May 2022 ;  Patient was receiving # 60 of Atazanavir and taking  2 PO QD   And since May 2022  Patient has been receiving # 30 of Atazanavir taking   One PO QD       Please advise       I left a message to advise  patient a new script was sent to  Pharmacy ; per Dr Mann.   FRANCESCA s  [facility]  8/02/22

## 2022-08-29 NOTE — PROGRESS NOTES
I spoke with patient to advise of lab results and will recheck in December ; per Dr Mann's orders   [facility]  8/29/22

## 2023-01-19 ENCOUNTER — TELEPHONE (OUTPATIENT)
Dept: INFECTIOUS DISEASES | Facility: CLINIC | Age: 63
End: 2023-01-19

## 2023-01-19 DIAGNOSIS — R73.03 PRE-DIABETES: ICD-10-CM

## 2023-01-19 DIAGNOSIS — Z21 HIV POSITIVE: Primary | ICD-10-CM

## 2023-01-19 DIAGNOSIS — E55.9 VITAMIN D DEFICIENCY: ICD-10-CM

## 2023-01-19 NOTE — TELEPHONE ENCOUNTER
I advised patient orders are in [facility] system   If he decides to go to a different facility to let me   Know so I can fax the orders   [facility]   1/23/23        Patient called [phone]    He is requesting lab orders for [facility] on [address]       He has an appointment with Dr Mann on 2/15/23    [facility]  1/19/23

## 2023-02-01 DIAGNOSIS — Z21 HIV POSITIVE: Primary | ICD-10-CM

## 2023-02-01 RX ORDER — ATAZANAVIR 200 MG/1
400 CAPSULE ORAL
Qty: 60 CAPSULE | Refills: 5 | Status: SHIPPED | OUTPATIENT
Start: 2023-02-01 | End: 2023-08-16 | Stop reason: SDUPTHER

## 2023-02-02 ENCOUNTER — LAB VISIT (OUTPATIENT)
Dept: LAB | Facility: HOSPITAL | Age: 63
End: 2023-02-02
Attending: INTERNAL MEDICINE
Payer: COMMERCIAL

## 2023-02-02 DIAGNOSIS — Z21 HIV POSITIVE: ICD-10-CM

## 2023-02-02 LAB
BILIRUB UR QL STRIP: NEGATIVE
CLARITY UR REFRACT.AUTO: CLEAR
COLOR UR AUTO: YELLOW
GLUCOSE UR QL STRIP: NEGATIVE
HGB UR QL STRIP: NEGATIVE
KETONES UR QL STRIP: NEGATIVE
LEUKOCYTE ESTERASE UR QL STRIP: NEGATIVE
NITRITE UR QL STRIP: NEGATIVE
PH UR STRIP: 6 [PH] (ref 5–8)
PROT UR QL STRIP: NEGATIVE
SP GR UR STRIP: 1.01 (ref 1–1.03)
URN SPEC COLLECT METH UR: NORMAL

## 2023-02-02 PROCEDURE — 81003 URINALYSIS AUTO W/O SCOPE: CPT | Performed by: INTERNAL MEDICINE

## 2023-02-15 ENCOUNTER — LAB VISIT (OUTPATIENT)
Dept: LAB | Facility: HOSPITAL | Age: 63
End: 2023-02-15
Attending: INTERNAL MEDICINE
Payer: COMMERCIAL

## 2023-02-15 ENCOUNTER — OFFICE VISIT (OUTPATIENT)
Dept: INFECTIOUS DISEASES | Facility: CLINIC | Age: 63
End: 2023-02-15
Payer: COMMERCIAL

## 2023-02-15 VITALS
HEART RATE: 81 BPM | OXYGEN SATURATION: 98 % | WEIGHT: 202.81 LBS | SYSTOLIC BLOOD PRESSURE: 120 MMHG | BODY MASS INDEX: 27.5 KG/M2 | DIASTOLIC BLOOD PRESSURE: 64 MMHG | TEMPERATURE: 98 F

## 2023-02-15 DIAGNOSIS — Z21 HIV POSITIVE: Primary | ICD-10-CM

## 2023-02-15 DIAGNOSIS — Z12.5 SCREENING FOR PROSTATE CANCER: ICD-10-CM

## 2023-02-15 DIAGNOSIS — Z79.899 ENCOUNTER FOR LONG-TERM (CURRENT) USE OF MEDICATIONS: ICD-10-CM

## 2023-02-15 DIAGNOSIS — R73.03 PRE-DIABETES: ICD-10-CM

## 2023-02-15 DIAGNOSIS — Z21 HIV POSITIVE: ICD-10-CM

## 2023-02-15 DIAGNOSIS — E55.9 VITAMIN D DEFICIENCY: ICD-10-CM

## 2023-02-15 LAB — COMPLEXED PSA SERPL-MCNC: 1.7 NG/ML (ref 0–4)

## 2023-02-15 PROCEDURE — 3078F DIAST BP <80 MM HG: CPT | Mod: CPTII,S$GLB,, | Performed by: INTERNAL MEDICINE

## 2023-02-15 PROCEDURE — 84153 ASSAY OF PSA TOTAL: CPT | Performed by: INTERNAL MEDICINE

## 2023-02-15 PROCEDURE — 3008F BODY MASS INDEX DOCD: CPT | Mod: CPTII,S$GLB,, | Performed by: INTERNAL MEDICINE

## 2023-02-15 PROCEDURE — 4010F ACE/ARB THERAPY RXD/TAKEN: CPT | Mod: CPTII,S$GLB,, | Performed by: INTERNAL MEDICINE

## 2023-02-15 PROCEDURE — 3074F PR MOST RECENT SYSTOLIC BLOOD PRESSURE < 130 MM HG: ICD-10-PCS | Mod: CPTII,S$GLB,, | Performed by: INTERNAL MEDICINE

## 2023-02-15 PROCEDURE — 3078F PR MOST RECENT DIASTOLIC BLOOD PRESSURE < 80 MM HG: ICD-10-PCS | Mod: CPTII,S$GLB,, | Performed by: INTERNAL MEDICINE

## 2023-02-15 PROCEDURE — 1159F MED LIST DOCD IN RCRD: CPT | Mod: CPTII,S$GLB,, | Performed by: INTERNAL MEDICINE

## 2023-02-15 PROCEDURE — 3044F PR MOST RECENT HEMOGLOBIN A1C LEVEL <7.0%: ICD-10-PCS | Mod: CPTII,S$GLB,, | Performed by: INTERNAL MEDICINE

## 2023-02-15 PROCEDURE — 3074F SYST BP LT 130 MM HG: CPT | Mod: CPTII,S$GLB,, | Performed by: INTERNAL MEDICINE

## 2023-02-15 PROCEDURE — 30000890 HC MISC. SEND OUT TEST

## 2023-02-15 PROCEDURE — 3044F HG A1C LEVEL LT 7.0%: CPT | Mod: CPTII,S$GLB,, | Performed by: INTERNAL MEDICINE

## 2023-02-15 PROCEDURE — 87906 NFCT AGT GNTYP ALYS HIV1: CPT

## 2023-02-15 PROCEDURE — 87901 NFCT AGT GNTYP ALYS HIV1 REV: CPT

## 2023-02-15 PROCEDURE — 4010F PR ACE/ARB THEARPY RXD/TAKEN: ICD-10-PCS | Mod: CPTII,S$GLB,, | Performed by: INTERNAL MEDICINE

## 2023-02-15 PROCEDURE — 3008F PR BODY MASS INDEX (BMI) DOCUMENTED: ICD-10-PCS | Mod: CPTII,S$GLB,, | Performed by: INTERNAL MEDICINE

## 2023-02-15 PROCEDURE — 87900 PHENOTYPE INFECT AGENT DRUG: CPT

## 2023-02-15 PROCEDURE — 36415 COLL VENOUS BLD VENIPUNCTURE: CPT | Performed by: INTERNAL MEDICINE

## 2023-02-15 PROCEDURE — 99215 OFFICE O/P EST HI 40 MIN: CPT | Mod: S$GLB,,, | Performed by: INTERNAL MEDICINE

## 2023-02-15 PROCEDURE — 99215 PR OFFICE/OUTPT VISIT, EST, LEVL V, 40-54 MIN: ICD-10-PCS | Mod: S$GLB,,, | Performed by: INTERNAL MEDICINE

## 2023-02-15 PROCEDURE — 1159F PR MEDICATION LIST DOCUMENTED IN MEDICAL RECORD: ICD-10-PCS | Mod: CPTII,S$GLB,, | Performed by: INTERNAL MEDICINE

## 2023-02-15 RX ORDER — ERGOCALCIFEROL 1.25 MG/1
50000 CAPSULE ORAL
Qty: 12 CAPSULE | Refills: 3 | Status: SHIPPED | OUTPATIENT
Start: 2023-02-15 | End: 2024-02-15

## 2023-02-15 NOTE — PATIENT INSTRUCTIONS
Potential primary care providers at [facility]:  MD Kayla Doll MD    Vitamin D 50,000 units once a week    Genosure ARchive resistance test    Due for colonoscopy this year, Dr. Dwayne Vela at [facility] or at [facility], GI    Return in 6 months

## 2023-02-15 NOTE — PROGRESS NOTES
Subjective:       Patient ID: Griffin Valenzuela is a 63 y.o. male.    Chief Complaint:: Follow-up (6 month follow HIV)      12/7/19: Drinking again , same as before, 4 times a week, 4 drinks per night. Stress at work.   Visited [place] in NOvember  Did have his flu vaccine, and at least one shingrix([employer])  No visits to PCP since last  No labs prior to this visit  Had an URI last week with fever, runny eyes and nose, cough. Rested and it resolved.     6/14/19: only complaint is a flare of his back pain. He has plenty of old hydrocodone to use. Encouraged him to use NSAID as well. His BP is always high after driving from [place]. Repeat was 140/94. His outpatient blood pressures are largely 125/80 (his record sent to us, measured by nurse at work). He did receive the second shingles vaccine. His bone density was improved compared to 2012. He was abstinent from alcohol for 4 months and resumed drinking. Encouraged moderation, because of lipids and BS. Reviewed lab, lipids are better. VL is detected less than 40, same as last summer. Adherence is excellent.     12/20/19:  He is under a great deal of stress because he is looking for new job, [place] is extra stressful and he just had to drive here from [place].  His blood pressure is always elevated when he is here but he blames it on the stress of the drive.  He has not been measuring his blood pressure as often and today it is just too high to ignore.  He rides his bike almost every day and is very fit, has No cardiovascular symptoms.  He is drinking more than usual because of the stress and he knows it is not healthy.  His liver enzymes are slightly elevated as is the bilirubin and his blood sugar is a bit higher is consistent with pre diabetes.  Viral load is less than 40 but detected and he reports missing his medication approximately twice per month.  He takes it in the morning with food and sometimes does not have time for breakfast.  He is also in a hurry  today.    1/31/20: here for a blood pressure check and the measurements are much improved on lisinopril 10 mg and abstinence from alcohol. Not really being mindful about food carbs? Stress from [place] has resolved. BP good even though he drove here(which usually elevates BP a lot). Reviewed lab and his VL is now undetectable. He had interruption of epzicom for 4 days.     6/18/20: usual HIV follow up. He is having flare up of back pain. Taking hydrocodone without relief. Took ibuprofen OTC in the past, but not lately. He has been sleeping on his stomach a little more lately which may be a factor. He does a little stretching when this occurs. Last visit with an orthopedist for his back was 1990ish. No recognized muscle spasm and no pain in the leg. He associates this with scoliosis.   Reviewed labs and bilirubin 2.1. He has not had any alcohol since mardi gras. Negative abd US 3 years ago. Has been riding his bike 2x a week, since he is working from home. He has been protecting himself from COVID. He is limiting white carbs. Discussed A1c . Lipids much better. He saw ENt about the spot on his palate and she is observing it with recheck(Dr. Kylah cardona)     12/17/20: blood pressure is normal!!  Has had f/u with ENT 10/2020 with no change and f/u 1 year. Still exercising on weekend. Still abstinent from alcohol.!  Did receive a flu shot.     6/24/21: still abstinent from alcohol. Had both COVID vaccines, BP perfect. Had a slip and head injury and received stitches. 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 Hem A1c is up to 6.6%, avg 143. He admits to cookies and chips and riding bike only twice a week.  He removed anticipates and improvement in his A1c by moving to this new job and he will make more of an effort to need  reduce unhealthy foods and does not want to start a medication yet    12/15/21: most recent flare of back pain prompted referral to Neurosurgery, who has  ordered an MRI and physical therapy. He has been awaiting the PT but in the interim he has had acupuncture with good results.  Not needing pain medication since then.  MRI is not been scheduled the was ordered on 12/02.  Still abstinent from alcohol.   ENT f/u states that mouth lesion is stable and yearly f/u. Went to the dentist 3 weeks ago.  Has lost 15 lb since last visit, likely some of which is intentional in some of which is loss of muscle mass from decreased activity due to back pain.    5/30/22: his job is ending in a week. He is considering his options. He feels good.     2/15/23: since last visit, he has gone to [employer]. 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 He is no longer established with primary care. Has been on epzicom and reyataz since prior to 2011, and possibly from the time of diagnosis.  The last year of viral loads have been just above 20. His compliance has always been excellent.       Current Outpatient Medications:     abacavir-lamivudine (EPZICOM) 600-300 mg per tablet, Take 1 tablet by mouth once daily., Disp: 30 tablet, Rfl: 6    atazanavir (REYATAZ) 200 MG Cap, Take 2 capsules (400 mg total) by mouth daily with breakfast., Disp: 60 capsule, Rfl: 5    calcium-vitamin D3 (OS-ESTRELLA 500 + D3) 500 mg(1,250mg) -200 unit per tablet, Take 1 tablet by mouth 2 (two) times daily with meals., Disp: , Rfl:     fish oil-omega-3 fatty acids 300-1,000 mg capsule, Take 2 capsules by mouth once daily., Disp: , Rfl:     HYDROcodone-acetaminophen (NORCO) 5-325 mg per tablet, Take 1 tablet by mouth every 6 (six) hours as needed for Pain., Disp: 28 tablet, Rfl: 0    lisinopriL 10 MG tablet, Take 1 tablet (10 mg total) by mouth once daily., Disp: 30 tablet, Rfl: 11    rosuvastatin (CRESTOR) 10 MG tablet, Take 1 tablet (10 mg total) by mouth once daily., Disp: 30 tablet, Rfl: 11    ergocalciferol (VITAMIN D2) 50,000 unit Cap, Take 1 capsule (50,000 Units total) by mouth every 7  days., Disp: 12 capsule, Rfl: 3     Review of patient's allergies indicates:  No Known Allergies  Past Medical History:   Diagnosis Date    Hepatitis A     serology positive    History of MRSA infection     Recurring    HIV positive 2008    Hypercholesteremia 2011    Hyperglycemia     Hypertension     LFTs abnormal     Mild    Scoliosis     With occasional back pain    Sleep apnea     Non compliant with CPAP     Past Surgical History:   Procedure Laterality Date    COLONOSCOPY  08/2010    COLONOSCOPY N/A 8/28/2020    Procedure: COLONOSCOPY;  Surgeon: Dwayne Vlea MD;  Location: [facility];  Service: Endoscopy;  Laterality: N/A;    ELBOW FRACTURE SURGERY  1968    HEMORRHOID SURGERY N/A 1980    TONSILLECTOMY  1964     Social History     Socioeconomic History    Marital status: Single   Occupational History    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  Rates diet as good.  He is not satisfied with weight.         Family History   Problem Relation Age of Onset    Osteoarthritis Mother     Stroke Father 86    Hyperlipidemia Brother         Tinnitus    Cancer Maternal Grandmother         lung    Cancer Maternal Grandfather         lung       Travel History: [place], [place] of [place], [place], [place], [place], [place], [place]  Vaccine History: yearly flu vaccine, MMR 2011, pneumovax 2008, prevnar 2014, eIPV 2011, typhoid 2011, 2014, HBV 1,2,3 and booster 2015, TdaP 2008,2021,  menactra 2017 2021,    zostavax 2017, shingrix 2018 x2, COVID vax x 2 feb 2021. And 2 in 2022.   Advanced Directive:   Safer Sex: abstinent  Bone Density: 8/2012 osteopenia, taking calcium plus D, 1/2019  improved  Colonoscopy: 8/2020(Dr. Vela, 4 polyps)  Due 2023    Review of Systems    Constitutional: No fever, chills, sweats, fatigue, weakness, weight loss    Eyes: No change in vision, loss of vision, diplopia, photophobia. UTD eye exam    ENT: chronic and mild allergic rhinitis/seasonal allergies. No purulent sinus drainage, sore throat, mouth pain, or lesions. UTD dental exam. He has not seen ENT this past year, as he was advised that because of its stability, he need not return for that.     Cardiovascular: No chest pain, AGRAWAL, palpitations or pedal edema.      Respiratory: No shortness of breath, AGRAWAL,      Gastrointestinal: No abdominal pain, nausea, vomiting, diarrhea,  Blood in stool    Genitourinary: No dysuria, hematuria, nocturia x1    Musculoskeletal: back is doing well. No other injuries,      Integumentary: no new skin lesions     Neurological: No dizziness, vertigo, unusual headaches, neuropathy,    Psychiatric: No   memory loss, no new sleep disturbance .   Having alcohol once week    Endocrine:  Thyroid normal. Monitoring his carb intake, less exercise since he was unemployed for several months  Lymphatic: No lymphadenopathy, blood loss, anemia, or malignancy    Objective:      Blood pressure 120/64, pulse 81, temperature 98.3 °F (36.8 °C), weight 92 kg (202 lb 12.8 oz), SpO2 98 %. Body mass index is 27.5 kg/m².  Physical Exam       General: Alert and attentive, cooperative and in no distress    Eyes: Pupils equal, round, reactive to light, anicteric, EOMI    Neck: Supple, non-tender, no thyromegaly or masses    ENT: EAC normal tympanic membranes,  nares patent,  teeth in good condition, no thrush. There was a 4 mm patch of white with rough surface in the posterior left soft palate. Non tender, . since June 2019 and has not changed 1/31/20.  Nor June 2020 nor December 2020 nor June 2021 nor December 2021 1/31/20 6/18/20 1/31/20: lesion unchanged in size. 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  06/24/2021, 12/15/:  No change    Cardiovascular: Regular rate and rhythm, no murmurs, rubs, or gallop    Respiratory: Lungs clear without wheezes, no rales, rub or rhonci    Gastrointestinal:  Soft and non tender, BS pos, no mass or organomegaly    Genitourinary: no flank tenderness    Vascular: No peripheral edema, phlebitis, warm and well perfused    Musculoskeletal: Ambulates without difficulty, no acute arthritis . Normal strength, negative SLR bilaterally.  Integumentary:          Believes these spots started when he injured his back on the faucet of the bathtub 20 years ago.  He is noted lately that the become more scaly.  They are nontender and nonpruritic he denies that there have ever been vesicles there but it is very difficult placed for him to see.  His phone cannot take photograph, 2006 version flip phone.  6/24/21:  The spots above are healed and very subtle at this time  12/15/21, 2/15/23.  No active lesions     AnusRectum: slightly decreased tone, prostate 10-15 g no nodules. No external lesions 2/15/23    Neurological: Normal LOC, cranial nerves, speech,  normal gait.  Reflexes are normal    Psychiatric: Normal mood, speech, demeanor    Lymphatic:   No cervical, supraclavicular, axillary or inguinal nodes     Wound:     HIV Table:   HLA-B 5701  negative   TOXOIgG     RPR 2/2023 non reactive   HEP A IgG 2/5/2013 pos, vaccinated   HBsAg     HBsAb 2/5/2016 positive, vax + booster   HBcAb     HBeAb     HBeAg     HCVAb 5/30/22 negative   HBV DNA     HCV RNA     Vitamin D 2/2023 Low 29   Chlamydia / GC     TB Gold  12/2020   Negative: IPPD yearly negative 2018 of the fact  PSA    2/15/23 1.7   CD4 count  11/2021 569  Hemoglobin A1c 2/2023  6.2%  [facility]      2/15/23       Recent Diagnostics: Reviewed lab        Assessment and Plan:           HIV positive  -     Cancel:  Misc Sendout Test, Blood [facility] test: HIV [facility]; Future; Expected date: 02/15/2023    Screening for prostate cancer  -     PSA, Screening; Future; Expected date: 02/15/2023    Vitamin D deficiency    Pre-diabetes    Encounter for long-term (current) use of medications    Other orders  -     ergocalciferol (VITAMIN D2) 50,000 unit Cap; Take 1 capsule (50,000 Units total) by mouth every 7 days.  Dispense: 12 capsule; Refill: 3          Potential primary care providers at [facility]:  MD Kayla Doll MD    Vitamin D 50,000 units once a week    [facility] resistance test  Separate calcium from reyataz    Due for colonoscopy this year, Dr. Dwayne Vela at [facility] or at [facility], GI    Return in 6 months    This note was created using Dragon voice recognition software that occasionally misinterpreted phrases or words.

## 2023-03-08 ENCOUNTER — PATIENT MESSAGE (OUTPATIENT)
Dept: INFECTIOUS DISEASES | Facility: CLINIC | Age: 63
End: 2023-03-08

## 2023-03-08 DIAGNOSIS — Z21 HIV POSITIVE: Primary | ICD-10-CM

## 2023-03-08 LAB
LABCORP MISC TEST CODE: NORMAL
LABCORP MISC TEST NAME: NORMAL
LABCORP MISCELLANEOUS TEST: NORMAL

## 2023-04-03 DIAGNOSIS — Z21 HIV POSITIVE: Primary | ICD-10-CM

## 2023-04-03 RX ORDER — ABACAVIR AND LAMIVUDINE 600; 300 MG/1; MG/1
1 TABLET, FILM COATED ORAL DAILY
Qty: 30 TABLET | Refills: 6 | Status: SHIPPED | OUTPATIENT
Start: 2023-04-03 | End: 2023-08-16 | Stop reason: SDUPTHER

## 2023-04-28 ENCOUNTER — LAB VISIT (OUTPATIENT)
Dept: LAB | Facility: HOSPITAL | Age: 63
End: 2023-04-28
Attending: INTERNAL MEDICINE
Payer: COMMERCIAL

## 2023-04-28 DIAGNOSIS — Z21 HIV POSITIVE: ICD-10-CM

## 2023-04-28 PROCEDURE — 36415 COLL VENOUS BLD VENIPUNCTURE: CPT | Mod: PO | Performed by: INTERNAL MEDICINE

## 2023-04-28 PROCEDURE — 87536 HIV-1 QUANT&REVRSE TRNSCRPJ: CPT | Performed by: INTERNAL MEDICINE

## 2023-05-01 LAB
HIV1 RNA # SERPL NAA+PROBE: <20 COPIES/ML
HIV1 RNA SERPL NAA+PROBE-LOG#: <1.3 LOG COPIES/ML
HIV1 RNA SERPL QL NAA+PROBE: DETECTED

## 2023-06-09 DIAGNOSIS — I10 HYPERTENSION, UNSPECIFIED TYPE: ICD-10-CM

## 2023-06-09 DIAGNOSIS — E78.00 HYPERCHOLESTEREMIA: Primary | ICD-10-CM

## 2023-06-09 DIAGNOSIS — Z21 HIV POSITIVE: ICD-10-CM

## 2023-06-09 RX ORDER — LISINOPRIL 10 MG/1
10 TABLET ORAL DAILY
Qty: 30 TABLET | Refills: 11 | Status: SHIPPED | OUTPATIENT
Start: 2023-06-09 | End: 2024-03-18 | Stop reason: SDUPTHER

## 2023-06-09 RX ORDER — ROSUVASTATIN CALCIUM 10 MG/1
10 TABLET, COATED ORAL DAILY
Qty: 30 TABLET | Refills: 11 | Status: SHIPPED | OUTPATIENT
Start: 2023-06-09 | End: 2024-03-18 | Stop reason: SDUPTHER

## 2023-06-26 ENCOUNTER — TELEPHONE (OUTPATIENT)
Dept: INFECTIOUS DISEASES | Facility: CLINIC | Age: 63
End: 2023-06-26

## 2023-06-26 NOTE — TELEPHONE ENCOUNTER
Referral was faxed to Dr Salazar's office on 6/26/23  And scanned to media in chart   [facility]  7/07/23      Patient called   ( wk ph #) [phone]    He is requesting a referral be faxed to his new dentist  Dr Torrey Salazar   ( fax # [phone])  For  routine dental work .    As his dental insurance has changed to [employer] and they are requesting a referral before they see him .    Please advise     [facility]  6/26/23

## 2023-08-03 ENCOUNTER — TELEPHONE (OUTPATIENT)
Dept: INFECTIOUS DISEASES | Facility: CLINIC | Age: 63
End: 2023-08-03

## 2023-08-03 DIAGNOSIS — R73.03 PRE-DIABETES: ICD-10-CM

## 2023-08-03 DIAGNOSIS — E55.9 VITAMIN D DEFICIENCY: ICD-10-CM

## 2023-08-03 DIAGNOSIS — Z21 HIV POSITIVE: Primary | ICD-10-CM

## 2023-08-03 NOTE — TELEPHONE ENCOUNTER
Patient called [phone]    Patient has an appointment with Dr Mann on 8/16/23    He is requesting Lab orders be placed for [facility]  On Allan MA [facility]  8/03/23

## 2023-08-07 PROBLEM — Z86.14 HISTORY OF MRSA INFECTION: Status: ACTIVE | Noted: 2023-08-07

## 2023-08-11 ENCOUNTER — LAB VISIT (OUTPATIENT)
Dept: LAB | Facility: HOSPITAL | Age: 63
End: 2023-08-11
Attending: INTERNAL MEDICINE
Payer: COMMERCIAL

## 2023-08-11 DIAGNOSIS — E55.9 VITAMIN D DEFICIENCY: ICD-10-CM

## 2023-08-11 DIAGNOSIS — R73.03 PRE-DIABETES: ICD-10-CM

## 2023-08-11 DIAGNOSIS — Z21 HIV POSITIVE: ICD-10-CM

## 2023-08-11 LAB
25(OH)D3+25(OH)D2 SERPL-MCNC: 35 NG/ML (ref 30–96)
ALBUMIN SERPL BCP-MCNC: 4.1 G/DL (ref 3.5–5.2)
ALP SERPL-CCNC: 57 U/L (ref 55–135)
ALT SERPL W/O P-5'-P-CCNC: 32 U/L (ref 10–44)
ANION GAP SERPL CALC-SCNC: 8 MMOL/L (ref 8–16)
AST SERPL-CCNC: 25 U/L (ref 10–40)
BASOPHILS # BLD AUTO: 0.05 K/UL (ref 0–0.2)
BASOPHILS NFR BLD: 0.9 % (ref 0–1.9)
BILIRUB SERPL-MCNC: 1.9 MG/DL (ref 0.1–1)
BUN SERPL-MCNC: 14 MG/DL (ref 8–23)
CALCIUM SERPL-MCNC: 9.8 MG/DL (ref 8.7–10.5)
CHLORIDE SERPL-SCNC: 106 MMOL/L (ref 95–110)
CHOLEST SERPL-MCNC: 152 MG/DL (ref 120–199)
CHOLEST/HDLC SERPL: 3.4 {RATIO} (ref 2–5)
CO2 SERPL-SCNC: 27 MMOL/L (ref 23–29)
CREAT SERPL-MCNC: 1.1 MG/DL (ref 0.5–1.4)
DIFFERENTIAL METHOD: ABNORMAL
EOSINOPHIL # BLD AUTO: 0.4 K/UL (ref 0–0.5)
EOSINOPHIL NFR BLD: 6.5 % (ref 0–8)
ERYTHROCYTE [DISTWIDTH] IN BLOOD BY AUTOMATED COUNT: 14 % (ref 11.5–14.5)
EST. GFR  (NO RACE VARIABLE): >60 ML/MIN/1.73 M^2
ESTIMATED AVG GLUCOSE: 126 MG/DL (ref 68–131)
GLUCOSE SERPL-MCNC: 114 MG/DL (ref 70–110)
HBA1C MFR BLD: 6 % (ref 4–5.6)
HCT VFR BLD AUTO: 46.7 % (ref 40–54)
HDLC SERPL-MCNC: 45 MG/DL (ref 40–75)
HDLC SERPL: 29.6 % (ref 20–50)
HGB BLD-MCNC: 15.2 G/DL (ref 14–18)
IMM GRANULOCYTES # BLD AUTO: 0.01 K/UL (ref 0–0.04)
IMM GRANULOCYTES NFR BLD AUTO: 0.2 % (ref 0–0.5)
LDLC SERPL CALC-MCNC: 90.4 MG/DL (ref 63–159)
LYMPHOCYTES # BLD AUTO: 2 K/UL (ref 1–4.8)
LYMPHOCYTES NFR BLD: 35.7 % (ref 18–48)
MCH RBC QN AUTO: 31.8 PG (ref 27–31)
MCHC RBC AUTO-ENTMCNC: 32.5 G/DL (ref 32–36)
MCV RBC AUTO: 98 FL (ref 82–98)
MONOCYTES # BLD AUTO: 0.6 K/UL (ref 0.3–1)
MONOCYTES NFR BLD: 11.1 % (ref 4–15)
NEUTROPHILS # BLD AUTO: 2.6 K/UL (ref 1.8–7.7)
NEUTROPHILS NFR BLD: 45.6 % (ref 38–73)
NONHDLC SERPL-MCNC: 107 MG/DL
NRBC BLD-RTO: 0 /100 WBC
PLATELET # BLD AUTO: 234 K/UL (ref 150–450)
PMV BLD AUTO: 9.4 FL (ref 9.2–12.9)
POTASSIUM SERPL-SCNC: 4.2 MMOL/L (ref 3.5–5.1)
PROT SERPL-MCNC: 7.6 G/DL (ref 6–8.4)
RBC # BLD AUTO: 4.78 M/UL (ref 4.6–6.2)
SODIUM SERPL-SCNC: 141 MMOL/L (ref 136–145)
TRIGL SERPL-MCNC: 83 MG/DL (ref 30–150)
WBC # BLD AUTO: 5.68 K/UL (ref 3.9–12.7)

## 2023-08-11 PROCEDURE — 87536 HIV-1 QUANT&REVRSE TRNSCRPJ: CPT | Performed by: INTERNAL MEDICINE

## 2023-08-11 PROCEDURE — 83036 HEMOGLOBIN GLYCOSYLATED A1C: CPT | Performed by: INTERNAL MEDICINE

## 2023-08-11 PROCEDURE — 80061 LIPID PANEL: CPT | Performed by: INTERNAL MEDICINE

## 2023-08-11 PROCEDURE — 80053 COMPREHEN METABOLIC PANEL: CPT | Performed by: INTERNAL MEDICINE

## 2023-08-11 PROCEDURE — 36415 COLL VENOUS BLD VENIPUNCTURE: CPT | Mod: PO | Performed by: INTERNAL MEDICINE

## 2023-08-11 PROCEDURE — 82306 VITAMIN D 25 HYDROXY: CPT | Performed by: INTERNAL MEDICINE

## 2023-08-11 PROCEDURE — 85025 COMPLETE CBC W/AUTO DIFF WBC: CPT | Performed by: INTERNAL MEDICINE

## 2023-08-14 LAB
HIV1 RNA # SERPL NAA+PROBE: 89 COPIES/ML
HIV1 RNA SERPL NAA+PROBE-LOG#: 1.95 LOG COPIES/ML
HIV1 RNA SERPL QL NAA+PROBE: DETECTED

## 2023-08-16 ENCOUNTER — OFFICE VISIT (OUTPATIENT)
Dept: INFECTIOUS DISEASES | Facility: CLINIC | Age: 63
End: 2023-08-16
Payer: COMMERCIAL

## 2023-08-16 VITALS
SYSTOLIC BLOOD PRESSURE: 116 MMHG | HEART RATE: 89 BPM | OXYGEN SATURATION: 98 % | BODY MASS INDEX: 27.48 KG/M2 | WEIGHT: 202.63 LBS | DIASTOLIC BLOOD PRESSURE: 64 MMHG

## 2023-08-16 DIAGNOSIS — E55.9 VITAMIN D DEFICIENCY: ICD-10-CM

## 2023-08-16 DIAGNOSIS — Z75.8 DOES NOT HAVE PRIMARY CARE PROVIDER: ICD-10-CM

## 2023-08-16 DIAGNOSIS — M41.9 SCOLIOSIS, UNSPECIFIED SCOLIOSIS TYPE, UNSPECIFIED SPINAL REGION: ICD-10-CM

## 2023-08-16 DIAGNOSIS — Z21 HIV POSITIVE: Primary | ICD-10-CM

## 2023-08-16 DIAGNOSIS — R73.03 PRE-DIABETES: ICD-10-CM

## 2023-08-16 PROCEDURE — 4010F ACE/ARB THERAPY RXD/TAKEN: CPT | Mod: CPTII,S$GLB,, | Performed by: INTERNAL MEDICINE

## 2023-08-16 PROCEDURE — 1159F MED LIST DOCD IN RCRD: CPT | Mod: CPTII,S$GLB,, | Performed by: INTERNAL MEDICINE

## 2023-08-16 PROCEDURE — 3044F HG A1C LEVEL LT 7.0%: CPT | Mod: CPTII,S$GLB,, | Performed by: INTERNAL MEDICINE

## 2023-08-16 PROCEDURE — 3074F PR MOST RECENT SYSTOLIC BLOOD PRESSURE < 130 MM HG: ICD-10-PCS | Mod: CPTII,S$GLB,, | Performed by: INTERNAL MEDICINE

## 2023-08-16 PROCEDURE — 3008F PR BODY MASS INDEX (BMI) DOCUMENTED: ICD-10-PCS | Mod: CPTII,S$GLB,, | Performed by: INTERNAL MEDICINE

## 2023-08-16 PROCEDURE — 3008F BODY MASS INDEX DOCD: CPT | Mod: CPTII,S$GLB,, | Performed by: INTERNAL MEDICINE

## 2023-08-16 PROCEDURE — 3078F PR MOST RECENT DIASTOLIC BLOOD PRESSURE < 80 MM HG: ICD-10-PCS | Mod: CPTII,S$GLB,, | Performed by: INTERNAL MEDICINE

## 2023-08-16 PROCEDURE — 4010F PR ACE/ARB THEARPY RXD/TAKEN: ICD-10-PCS | Mod: CPTII,S$GLB,, | Performed by: INTERNAL MEDICINE

## 2023-08-16 PROCEDURE — 99214 PR OFFICE/OUTPT VISIT, EST, LEVL IV, 30-39 MIN: ICD-10-PCS | Mod: S$GLB,,, | Performed by: INTERNAL MEDICINE

## 2023-08-16 PROCEDURE — 1160F PR REVIEW ALL MEDS BY PRESCRIBER/CLIN PHARMACIST DOCUMENTED: ICD-10-PCS | Mod: CPTII,S$GLB,, | Performed by: INTERNAL MEDICINE

## 2023-08-16 PROCEDURE — 3074F SYST BP LT 130 MM HG: CPT | Mod: CPTII,S$GLB,, | Performed by: INTERNAL MEDICINE

## 2023-08-16 PROCEDURE — 99214 OFFICE O/P EST MOD 30 MIN: CPT | Mod: S$GLB,,, | Performed by: INTERNAL MEDICINE

## 2023-08-16 PROCEDURE — 3078F DIAST BP <80 MM HG: CPT | Mod: CPTII,S$GLB,, | Performed by: INTERNAL MEDICINE

## 2023-08-16 PROCEDURE — 1160F RVW MEDS BY RX/DR IN RCRD: CPT | Mod: CPTII,S$GLB,, | Performed by: INTERNAL MEDICINE

## 2023-08-16 PROCEDURE — 3044F PR MOST RECENT HEMOGLOBIN A1C LEVEL <7.0%: ICD-10-PCS | Mod: CPTII,S$GLB,, | Performed by: INTERNAL MEDICINE

## 2023-08-16 PROCEDURE — 1159F PR MEDICATION LIST DOCUMENTED IN MEDICAL RECORD: ICD-10-PCS | Mod: CPTII,S$GLB,, | Performed by: INTERNAL MEDICINE

## 2023-08-16 RX ORDER — ATAZANAVIR 200 MG/1
400 CAPSULE ORAL
Qty: 60 CAPSULE | Refills: 6 | Status: SHIPPED | OUTPATIENT
Start: 2023-08-16 | End: 2024-03-15 | Stop reason: SDUPTHER

## 2023-08-16 RX ORDER — HYDROCODONE BITARTRATE AND ACETAMINOPHEN 5; 325 MG/1; MG/1
1 TABLET ORAL EVERY 6 HOURS PRN
Qty: 28 TABLET | Refills: 0 | Status: SHIPPED | OUTPATIENT
Start: 2023-08-16

## 2023-08-16 RX ORDER — ABACAVIR AND LAMIVUDINE 600; 300 MG/1; MG/1
1 TABLET, FILM COATED ORAL DAILY
Qty: 30 TABLET | Refills: 6 | Status: SHIPPED | OUTPATIENT
Start: 2023-08-16 | End: 2024-03-15 | Stop reason: SDUPTHER

## 2023-08-16 NOTE — PROGRESS NOTES
Subjective:       Patient ID: Griffin Valenzuela is a 63 y.o. male.    Chief Complaint:: Follow-up (6 month follow up)      12/7/19: Drinking again , same as before, 4 times a week, 4 drinks per night. Stress at work.   Visited [place] in NOvember  Did have his flu vaccine, and at least one shingrix([employer])  No visits to PCP since last  No labs prior to this visit  Had an URI last week with fever, runny eyes and nose, cough. Rested and it resolved.     6/14/19: only complaint is a flare of his back pain. He has plenty of old hydrocodone to use. Encouraged him to use NSAID as well. His BP is always high after driving from [place]. Repeat was 140/94. His outpatient blood pressures are largely 125/80 (his record sent to us, measured by nurse at work). He did receive the second shingles vaccine. His bone density was improved compared to 2012. He was abstinent from alcohol for 4 months and resumed drinking. Encouraged moderation, because of lipids and BS. Reviewed lab, lipids are better. VL is detected less than 40, same as last summer. Adherence is excellent.     12/20/19:  He is under a great deal of stress because he is looking for new job, [place] is extra stressful and he just had to drive here from [place].  His blood pressure is always elevated when he is here but he blames it on the stress of the drive.  He has not been measuring his blood pressure as often and today it is just too high to ignore.  He rides his bike almost every day and is very fit, has No cardiovascular symptoms.  He is drinking more than usual because of the stress and he knows it is not healthy.  His liver enzymes are slightly elevated as is the bilirubin and his blood sugar is a bit higher is consistent with pre diabetes.  Viral load is less than 40 but detected and he reports missing his medication approximately twice per month.  He takes it in the morning with food and sometimes does not have time for breakfast.  He is also in a hurry  today.    1/31/20: here for a blood pressure check and the measurements are much improved on lisinopril 10 mg and abstinence from alcohol. Not really being mindful about food carbs? Stress from [place] has resolved. BP good even though he drove here(which usually elevates BP a lot). Reviewed lab and his VL is now undetectable. He had interruption of epzicom for 4 days.     6/18/20: usual HIV follow up. He is having flare up of back pain. Taking hydrocodone without relief. Took ibuprofen OTC in the past, but not lately. He has been sleeping on his stomach a little more lately which may be a factor. He does a little stretching when this occurs. Last visit with an orthopedist for his back was 1990ish. No recognized muscle spasm and no pain in the leg. He associates this with scoliosis.   Reviewed labs and bilirubin 2.1. He has not had any alcohol since mardi gras. Negative abd US 3 years ago. Has been riding his bike 2x a week, since he is working from home. He has been protecting himself from COVID. He is limiting white carbs. Discussed A1c . Lipids much better. He saw ENt about the spot on his palate and she is observing it with recheck(Dr. Kylah cardona)     12/17/20: blood pressure is normal!!  Has had f/u with ENT 10/2020 with no change and f/u 1 year. Still exercising on weekend. Still abstinent from alcohol.!  Did receive a flu shot.     6/24/21: still abstinent from alcohol. Had both COVID vaccines, BP perfect. Had a slip and head injury and received stitches. 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 Hem A1c is up to 6.6%, avg 143. He admits to cookies and chips and riding bike only twice a week.  He removed anticipates and improvement in his A1c by moving to this new job and he will make more of an effort to need  reduce unhealthy foods and does not want to start a medication yet    12/15/21: most recent flare of back pain prompted referral to Neurosurgery, who has  ordered an MRI and physical therapy. He has been awaiting the PT but in the interim he has had acupuncture with good results.  Not needing pain medication since then.  MRI is not been scheduled the was ordered on 12/02.  Still abstinent from alcohol.   ENT f/u states that mouth lesion is stable and yearly f/u. Went to the dentist 3 weeks ago.  Has lost 15 lb since last visit, likely some of which is intentional in some of which is loss of muscle mass from decreased activity due to back pain.    5/30/22: his job is ending in a week. He is considering his options. He feels good.     2/15/23: since last visit, he has gone to [place]. 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 He is no longer established with primary care. Has been on epzicom and reyataz since prior to 2011, and possibly from the time of diagnosis.  The last year of viral loads have been just above 20. His compliance has always been excellent.     8/16/23: reviewed lab and viral load was 89. He had a gap in meds a few weeks ago and he was off meds for 4 days. Blip in feb, and archive was all sensitive. He is established with [facility] pharmacy again. Vitamin d is better, but it has been less than 3 months on weekly replacement of 50k. Still exercising, but heat makes it unpleasant  Walks at work. A1c is improved. We discussed who he will see in the future.       Current Outpatient Medications:     calcium-vitamin D3 (OS-ESTRELLA 500 + D3) 500 mg(1,250mg) -200 unit per tablet, Take 1 tablet by mouth 2 (two) times daily with meals., Disp: , Rfl:     ergocalciferol (VITAMIN D2) 50,000 unit Cap, Take 1 capsule (50,000 Units total) by mouth every 7 days., Disp: 12 capsule, Rfl: 3    fish oil-omega-3 fatty acids 300-1,000 mg capsule, Take 2 capsules by mouth once daily., Disp: , Rfl:     lisinopriL 10 MG tablet, Take 1 tablet (10 mg total) by mouth once daily., Disp: 30 tablet, Rfl: 11    rosuvastatin (CRESTOR) 10 MG tablet, Take 1  tablet (10 mg total) by mouth once daily., Disp: 30 tablet, Rfl: 11    abacavir-lamivudine (EPZICOM) 600-300 mg per tablet, Take 1 tablet by mouth once daily., Disp: 30 tablet, Rfl: 6    atazanavir (REYATAZ) 200 MG Cap, Take 2 capsules (400 mg total) by mouth daily with breakfast., Disp: 60 capsule, Rfl: 6    HYDROcodone-acetaminophen (NORCO) 5-325 mg per tablet, Take 1 tablet by mouth every 6 (six) hours as needed for Pain., Disp: 28 tablet, Rfl: 0     Review of patient's allergies indicates:  No Known Allergies  Past Medical History:   Diagnosis Date    Hepatitis A     serology positive    History of MRSA infection     Recurring    HIV positive 2008    Hypercholesteremia 2011    Hyperglycemia     Hypertension     LFTs abnormal     Mild    Scoliosis     With occasional back pain    Sleep apnea     Non compliant with CPAP     Past Surgical History:   Procedure Laterality Date    COLONOSCOPY  08/2010    COLONOSCOPY N/A 8/28/2020    Procedure: COLONOSCOPY;  Surgeon: Dwayne Vela MD;  Location: [facility];  Service: Endoscopy;  Laterality: N/A;    ELBOW FRACTURE SURGERY  1968    HEMORRHOID SURGERY N/A 1980    TONSILLECTOMY  1964     Social History     Socioeconomic History    Marital status: Single   Occupational History    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  Rates diet as good.  He is not satisfied with weight.         Family History   Problem Relation Age of Onset    Osteoarthritis Mother     Stroke Father 86    Hyperlipidemia Brother         Tinnitus    Cancer Maternal Grandmother         lung    Cancer Maternal Grandfather         lung       Travel History: [place], most of [place],  [facility], [place], [place], [place], [place]  Vaccine History: yearly flu vaccine, MMR 2011, pneumovax 2008, prevnar 2014, eIPV 2011, typhoid 2011, 2014, HBV 1,2,3 and booster 2015, TdaP 2008,2021,  menactra 2017 2021,    zostavax 2017, shingrix 2018 x2, COVID vax x 2 feb 2021. And 2 in 2022.   Advanced Directive:   Safer Sex: abstinent  Bone Density: 8/2012 osteopenia, taking calcium plus D, 1/2019 improved  Colonoscopy: 8/2020(Dr. Vela, 4 polyps)  Due 2023    Review of Systems    Constitutional: No fever, chills, sweats, fatigue, weakness, weight loss.     Eyes: No change in vision, loss of vision, diplopia, photophobia. UTD eye exam    ENT: chronic and mild allergic rhinitis/seasonal allergies. No purulent sinus drainage, sore throat, mouth pain, or lesions. UTD dental exam.     Cardiovascular: No chest pain, AGRAWAL, palpitations or pedal edema.      Respiratory: No shortness of breath, AGRAWAL,      Gastrointestinal: No abdominal pain, nausea, vomiting, diarrhea,  Blood in stool    Genitourinary: No dysuria, hematuria, nocturia x1    Musculoskeletal: back is doing well. No other injuries,      Integumentary: no new skin lesions     Neurological: No dizziness, vertigo, unusual headaches, neuropathy,    Psychiatric: No   memory loss, no new sleep disturbance .   Having alcohol once week    Endocrine:  Thyroid normal. Monitoring his carb intake, less exercise   Lymphatic: No lymphadenopathy, blood loss, anemia, or malignancy    Objective:      Blood pressure 116/64, pulse 89, weight 91.9 kg (202 lb 9.6 oz), SpO2 98 %. Body mass index is 27.48 kg/m².  Physical Exam       General: Alert and attentive, cooperative and in no distress    Eyes: Pupils equal, round, reactive to light, anicteric, EOMI    Neck: Supple, non-tender, no thyromegaly or masses    ENT: EAC normal tympanic membranes,  nares patent,  teeth in good condition, no thrush. There was a 4 mm patch of white with rough surface in the posterior  left soft palate. Non tender, . since June 2019 and has not changed 1/31/20.  Nor June 2020 nor December 2020 nor June 2021 nor December 2021 1/31/20 6/18/20 1/31/20: lesion unchanged in size. 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  06/24/2021, 12/15/22, 8/16/23:  No change    Cardiovascular: Regular rate and rhythm, no murmurs, rubs, or gallop    Respiratory: Lungs clear without wheezes, no rales, rub or rhonci    Gastrointestinal:  Soft and non tender, BS pos, no mass or organomegaly    Genitourinary: no flank tenderness    Vascular: No peripheral edema, phlebitis, warm and well perfused    Musculoskeletal: Ambulates without difficulty, no acute arthritis . Normal strength, negative SLR bilaterally.  Integumentary:          Believes these spots started when he injured his back on the faucet of the bathtub 20 years ago.  He is noted lately that the become more scaly.  They are nontender and nonpruritic he denies that there have ever been vesicles there but it is very difficult placed for him to see.  His phone cannot take photograph, 2006 version [employer] phone.  6/24/21:  The spots above are healed and very subtle at this time  12/15/21, 2/15/23.  No active lesions     AnusRectum: slightly decreased tone, prostate 10-15 g no nodules. No external lesions 2/15/23    Neurological: Normal LOC, cranial nerves, speech,  normal gait.  Reflexes are normal    Psychiatric: Normal mood, speech, demeanor    Lymphatic:   No cervical, supraclavicular, axillary or inguinal nodes     Wound:     HIV Table:   HLA-B 5701  negative   TOXOIgG     RPR 2/2023 non reactive   HEP A IgG 2/5/2013 pos, vaccinated   HBsAg     HBsAb 2/5/2016 positive, vax + booster   HBcAb     HBeAb     HBeAg     HCVAb 5/30/22 negative   HBV DNA     HCV RNA     Vitamin D 8/11/23 35, up from 29   Chlamydia / GC     TB Gold  2/2/23     Negative: IP yearly negative 2018 of the fact  PSA    2/15/23 1.7   CD4 count  2/2023  692  Hemoglobin A1c 8/2023  6.0%  [employer]      2/15/23 All sensitive       Recent Diagnostics: Reviewed lab        Assessment and Plan:           HIV positive  -     Ambulatory referral/consult to Infectious Disease; Future; Expected date: 08/23/2023  -     HIV RNA, Quantitative, PCR; Future; Expected date: 08/30/2023  -     atazanavir (REYATAZ) 200 MG Cap; Take 2 capsules (400 mg total) by mouth daily with breakfast.  Dispense: 60 capsule; Refill: 6  -     abacavir-lamivudine (EPZICOM) 600-300 mg per tablet; Take 1 tablet by mouth once daily.  Dispense: 30 tablet; Refill: 6    Does not have primary care provider  -     Ambulatory referral/consult to Internal Medicine; Future; Expected date: 08/23/2023    Scoliosis, unspecified scoliosis type, unspecified spinal region  -     HYDROcodone-acetaminophen (NORCO) 5-325 mg per tablet; Take 1 tablet by mouth every 6 (six) hours as needed for Pain.  Dispense: 28 tablet; Refill: 0    Vitamin D deficiency    Pre-diabetes        Working on referral to ID for next year. I did send referral to Dr. Steve  Please pursue primary care provider    Please contact Dr. Vela about repeat colonoscopy    Repeat viral load in 2-4 weeks    This note was created using Dragon voice recognition software that occasionally misinterpreted phrases or words.

## 2023-08-16 NOTE — PATIENT INSTRUCTIONS
Working on referral to ID for next year. I did send to Dr. Steve  Please pursue primary care provider    Please contact Dr. Vela about repeat colonoscopy    Repeat viral load in 2-4 weeks

## 2023-09-15 ENCOUNTER — LAB VISIT (OUTPATIENT)
Dept: LAB | Facility: HOSPITAL | Age: 63
End: 2023-09-15
Attending: INTERNAL MEDICINE
Payer: COMMERCIAL

## 2023-09-15 DIAGNOSIS — Z21 HIV POSITIVE: ICD-10-CM

## 2023-09-15 PROCEDURE — 36415 COLL VENOUS BLD VENIPUNCTURE: CPT | Mod: PO | Performed by: INTERNAL MEDICINE

## 2023-09-15 PROCEDURE — 87536 HIV-1 QUANT&REVRSE TRNSCRPJ: CPT | Performed by: INTERNAL MEDICINE

## 2023-09-18 ENCOUNTER — PATIENT MESSAGE (OUTPATIENT)
Dept: INFECTIOUS DISEASES | Facility: CLINIC | Age: 63
End: 2023-09-18

## 2023-12-26 ENCOUNTER — TELEPHONE (OUTPATIENT)
Dept: INFECTIOUS DISEASES | Facility: CLINIC | Age: 63
End: 2023-12-26
Payer: COMMERCIAL

## 2023-12-26 NOTE — TELEPHONE ENCOUNTER
"----- Message from Becky Gray sent at 12/26/2023 11:54 AM CST -----  Regarding: appt access  Contact: [phone] or [phone]  Name Of Caller: self     Contact Preference?:  [phone] or [phone]     What is the nature of the call?: would like to schedule an appt for HIV     Additional Notes:    "Thank you for all that you do for our patients"        "

## 2023-12-26 NOTE — TELEPHONE ENCOUNTER
I left a message for patient with scheduled appt with Dr Fernandez.  I left my extension for him to contact me if he needs to reschedule

## 2023-12-27 ENCOUNTER — TELEPHONE (OUTPATIENT)
Dept: INFECTIOUS DISEASES | Facility: CLINIC | Age: 63
End: 2023-12-27
Payer: COMMERCIAL

## 2023-12-27 NOTE — TELEPHONE ENCOUNTER
Patient would like to find out from Dr Baumgarten if she would accept him as a new viral patient.  Patient is stable on Viral medication x 10 years. He also has had multiple colleagues refer him to Dr Baumgarten.  She is out this week I will find out Tues next week and let him know.

## 2024-01-04 ENCOUNTER — PATIENT MESSAGE (OUTPATIENT)
Dept: INFECTIOUS DISEASES | Facility: CLINIC | Age: 64
End: 2024-01-04

## 2024-01-18 ENCOUNTER — OFFICE VISIT (OUTPATIENT)
Dept: PRIMARY CARE CLINIC | Facility: CLINIC | Age: 64
End: 2024-01-18
Payer: COMMERCIAL

## 2024-01-18 VITALS
BODY MASS INDEX: 28.27 KG/M2 | TEMPERATURE: 98 F | HEIGHT: 72 IN | RESPIRATION RATE: 18 BRPM | OXYGEN SATURATION: 98 % | HEART RATE: 82 BPM | WEIGHT: 208.75 LBS

## 2024-01-18 DIAGNOSIS — M77.8 TENDINITIS OF LEFT SHOULDER: Primary | ICD-10-CM

## 2024-01-18 PROCEDURE — 99499 UNLISTED E&M SERVICE: CPT | Mod: S$GLB,,, | Performed by: INTERNAL MEDICINE

## 2024-01-18 RX ORDER — TIZANIDINE 4 MG/1
4 TABLET ORAL NIGHTLY
Qty: 30 TABLET | Refills: 0 | Status: SHIPPED | OUTPATIENT
Start: 2024-01-18 | End: 2024-02-17

## 2024-01-18 NOTE — PATIENT INSTRUCTIONS
Thank you for visiting today.  It was likely you have a tendinitis or some form of muscle spasm causing the pain radiating from your trapezius muscle into your wrist.  Typically this has caused by some nerve compression bought I can find no evidence that on exam.  Treatment at this time would be symptomatic and I recommend the following:   Alleve 2-3 tablets with breakfast and dinner for 7 days   Tylenol arthritis or acetaminophen extended release 650 mg 2 tablets with lunch and bedtime  I have sent a prescription for a muscle relaxant at bedtime tizanidine to be taken at night.  I have given you 30 day supply because there may be some residual soreness or tightness in the area.  I would continue the muscle relaxant for about 5-7 days after all other symptoms resolve and I would also continue Tylenol at bedtime  Also warm moist heat is helpful such as a shower letting the water run on it or if lying in bed a heating pad would help.

## 2024-03-15 ENCOUNTER — OFFICE VISIT (OUTPATIENT)
Dept: INFECTIOUS DISEASES | Facility: CLINIC | Age: 64
End: 2024-03-15
Payer: MEDICARE

## 2024-03-15 ENCOUNTER — TELEPHONE (OUTPATIENT)
Dept: INFECTIOUS DISEASES | Facility: CLINIC | Age: 64
End: 2024-03-15
Payer: MEDICARE

## 2024-03-15 DIAGNOSIS — Z12.11 COLON CANCER SCREENING: Primary | ICD-10-CM

## 2024-03-15 DIAGNOSIS — I10 HYPERTENSION, UNSPECIFIED TYPE: ICD-10-CM

## 2024-03-15 DIAGNOSIS — E78.00 HYPERCHOLESTEREMIA: ICD-10-CM

## 2024-03-15 DIAGNOSIS — R29.3 POSTURE ABNORMALITY: ICD-10-CM

## 2024-03-15 DIAGNOSIS — Z21 HIV POSITIVE: Primary | ICD-10-CM

## 2024-03-15 DIAGNOSIS — Z12.11 COLON CANCER SCREENING: ICD-10-CM

## 2024-03-15 DIAGNOSIS — K63.5 POLYP OF COLON, UNSPECIFIED PART OF COLON, UNSPECIFIED TYPE: ICD-10-CM

## 2024-03-15 PROCEDURE — 99205 OFFICE O/P NEW HI 60 MIN: CPT | Mod: 95,,, | Performed by: INTERNAL MEDICINE

## 2024-03-15 RX ORDER — ABACAVIR AND LAMIVUDINE 600; 300 MG/1; MG/1
1 TABLET, FILM COATED ORAL DAILY
Qty: 30 TABLET | Refills: 11 | Status: SHIPPED | OUTPATIENT
Start: 2024-03-15 | End: 2024-04-08

## 2024-03-15 RX ORDER — ATAZANAVIR 200 MG/1
400 CAPSULE ORAL
Qty: 60 CAPSULE | Refills: 11 | Status: SHIPPED | OUTPATIENT
Start: 2024-03-15 | End: 2024-04-08

## 2024-03-15 NOTE — TELEPHONE ENCOUNTER
----- Message from Katherine L. Baumgarten, MD sent at 3/15/2024 11:04 AM CDT -----  Labs at [facility] early am next week. Colonoscopy at [facility] with female provider at [facility]. See me in person in 3-4 weeks.  
General

## 2024-03-15 NOTE — Clinical Note
Labs at [facility] early am next week. Colonoscopy at [facility] with female provider at [facility]. See me in person in 3-4 weeks.

## 2024-03-18 ENCOUNTER — PATIENT MESSAGE (OUTPATIENT)
Dept: INFECTIOUS DISEASES | Facility: CLINIC | Age: 64
End: 2024-03-18
Payer: MEDICARE

## 2024-03-18 DIAGNOSIS — Z21 HIV POSITIVE: ICD-10-CM

## 2024-03-18 DIAGNOSIS — I10 HYPERTENSION, UNSPECIFIED TYPE: ICD-10-CM

## 2024-03-18 DIAGNOSIS — E78.00 HYPERCHOLESTEREMIA: ICD-10-CM

## 2024-03-18 RX ORDER — LISINOPRIL 10 MG/1
10 TABLET ORAL DAILY
Qty: 90 TABLET | Refills: 3 | Status: SHIPPED | OUTPATIENT
Start: 2024-03-18 | End: 2025-03-18

## 2024-03-18 RX ORDER — ROSUVASTATIN CALCIUM 10 MG/1
10 TABLET, COATED ORAL DAILY
Qty: 90 TABLET | Refills: 3 | Status: SHIPPED | OUTPATIENT
Start: 2024-03-18 | End: 2025-03-18

## 2024-03-18 NOTE — PROGRESS NOTES
Subjective:       Patient ID: Griffin Valenzuela is a 64 y.o. male.    Chief Complaint:: No chief complaint on file.      Seeing me today to establish care. His ID MD retired in December.  HIV well controlled. No issues taking or obtaining meds.     Concerned about scoliosis and occasional flares with back pain. Usually happens twice a year- bed bound and takes hydrocodone.  No urgent issues today.    . 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    From Dr. Rodriguez 8/16/23 last note:  12/7/19: Drinking again , same as before, 4 times a week, 4 drinks per night. Stress at work.   Visited [place] in NOvember  Did have his flu vaccine, and at least one shingrix([employer])  No visits to PCP since last  No labs prior to this visit  Had an URI last week with fever, runny eyes and nose, cough. Rested and it resolved.     6/14/19: only complaint is a flare of his back pain. He has plenty of old hydrocodone to use. Encouraged him to use NSAID as well. His BP is always high after driving from [place]. Repeat was 140/94. His outpatient blood pressures are largely 125/80 (his record sent to us, measured by nurse at work). He did receive the second shingles vaccine. His bone density was improved compared to 2012. He was abstinent from alcohol for 4 months and resumed drinking. Encouraged moderation, because of lipids and BS. Reviewed lab, lipids are better. VL is detected less than 40, same as last summer. Adherence is excellent.     12/20/19:  He is under a great deal of stress because he is looking for new job, [place] is extra stressful and he just had to drive here from [place].  His blood pressure is always elevated when he is here but he blames it on the stress of the drive.  He has not been measuring his blood pressure as often and today it is just too high to ignore.  He rides his bike almost every day and is very fit, has No cardiovascular symptoms.  He is drinking more than usual  because of the stress and he knows it is not healthy.  His liver enzymes are slightly elevated as is the bilirubin and his blood sugar is a bit higher is consistent with pre diabetes.  Viral load is less than 40 but detected and he reports missing his medication approximately twice per month.  He takes it in the morning with food and sometimes does not have time for breakfast.  He is also in a hurry today.    1/31/20: here for a blood pressure check and the measurements are much improved on lisinopril 10 mg and abstinence from alcohol. Not really being mindful about food carbs? Stress from [place] has resolved. BP good even though he drove here(which usually elevates BP a lot). Reviewed lab and his VL is now undetectable. He had interruption of epzicom for 4 days.     6/18/20: usual HIV follow up. He is having flare up of back pain. Taking hydrocodone without relief. Took ibuprofen OTC in the past, but not lately. He has been sleeping on his stomach a little more lately which may be a factor. He does a little stretching when this occurs. Last visit with an orthopedist for his back was 1990ish. No recognized muscle spasm and no pain in the leg. He associates this with scoliosis.   Reviewed labs and bilirubin 2.1. He has not had any alcohol since mardi gras. Negative abd US 3 years ago. Has been riding his bike 2x a week, since he is working from home. He has been protecting himself from COVID. He is limiting white carbs. Discussed A1c . Lipids much better. He saw ENt about the spot on his palate and she is observing it with recheck(Dr. Kylah cardona)     12/17/20: blood pressure is normal!!  Has had f/u with ENT 10/2020 with no change and f/u 1 year. Still exercising on weekend. Still abstinent from alcohol.!  Did receive a flu shot.     6/24/21: still abstinent from alcohol. Had both COVID vaccines, BP perfect. Had a slip and head injury and received stitches. 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 Hem A1c is up to 6.6%, avg 143. He admits to cookies and chips and riding bike only twice a week.  He removed anticipates and improvement in his A1c by moving to this new job and he will make more of an effort to need  reduce unhealthy foods and does not want to start a medication yet    12/15/21: most recent flare of back pain prompted referral to Neurosurgery, who has ordered an MRI and physical therapy. He has been awaiting the PT but in the interim he has had acupuncture with good results.  Not needing pain medication since then.  MRI is not been scheduled the was ordered on 12/02.  Still abstinent from alcohol.   ENT f/u states that mouth lesion is stable and yearly f/u. Went to the dentist 3 weeks ago.  Has lost 15 lb since last visit, likely some of which is intentional in some of which is loss of muscle mass from decreased activity due to back pain.    5/30/22: his job is ending in a week. He is considering his options. He feels good.     2/15/23: since last visit, he has gone to [employer]. 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 He is no longer established with primary care. Has been on epzicom and reyataz since prior to 2011, and possibly from the time of diagnosis.  The last year of viral loads have been just above 20. His compliance has always been excellent.     8/16/23: reviewed lab and viral load was 89. He had a gap in meds a few weeks ago and he was off meds for 4 days. Blip in feb, and archive was all sensitive. He is established with [facility]'s pharmacy again. Vitamin d is better, but it has been less than 3 months on weekly replacement of 50k. Still exercising, but heat makes it unpleasant  Walks at work. A1c is improved. We discussed who he will see in the future.       Current Outpatient Medications:     abacavir-lamivudine (EPZICOM) 600-300 mg per tablet, Take 1 tablet by mouth once daily., Disp: 30 tablet, Rfl: 11    atazanavir  (REYATAZ) 200 MG Cap, Take 2 capsules (400 mg total) by mouth daily with breakfast., Disp: 60 capsule, Rfl: 11    calcium-vitamin D3 (OS-ESTRELLA 500 + D3) 500 mg(1,250mg) -200 unit per tablet, Take 1 tablet by mouth once daily., Disp: , Rfl:     fish oil-omega-3 fatty acids 300-1,000 mg capsule, Take 2 capsules by mouth once daily., Disp: , Rfl:     HYDROcodone-acetaminophen (NORCO) 5-325 mg per tablet, Take 1 tablet by mouth every 6 (six) hours as needed for Pain., Disp: 28 tablet, Rfl: 0    lisinopriL 10 MG tablet, Take 1 tablet (10 mg total) by mouth once daily., Disp: 30 tablet, Rfl: 11    rosuvastatin (CRESTOR) 10 MG tablet, Take 1 tablet (10 mg total) by mouth once daily., Disp: 30 tablet, Rfl: 11     Review of patient's allergies indicates:  No Known Allergies  Past Medical History:   Diagnosis Date    Hepatitis A     serology positive    History of MRSA infection     Recurring    HIV positive 2008    Hypercholesteremia 2011    Hyperglycemia     Hypertension     LFTs abnormal     Mild    Scoliosis     With occasional back pain    Sleep apnea     Non compliant with CPAP     Past Surgical History:   Procedure Laterality Date    COLONOSCOPY  08/2010    COLONOSCOPY N/A 8/28/2020    Procedure: COLONOSCOPY;  Surgeon: Dwayne Vela MD;  Location: [facility];  Service: Endoscopy;  Laterality: N/A;    ELBOW FRACTURE SURGERY  1968    HEMORRHOID SURGERY N/A 1980    TONSILLECTOMY  1964     Social History     Socioeconomic History    Marital status: Single   Occupational History    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  Rates diet as good.  He is not satisfied with weight.         Social Determinants of  Health     Financial Resource Strain: Low Risk  (3/8/2024)    Overall Financial Resource Strain (CARDIA)     Difficulty of Paying Living Expenses: Not hard at all   Food Insecurity: No Food Insecurity (3/8/2024)    Hunger Vital Sign     Worried About Running Out of Food in the Last Year: Never true     Ran Out of Food in the Last Year: Never true   Transportation Needs: No Transportation Needs (3/8/2024)    PRAPARE - Transportation     Lack of Transportation (Medical): No     Lack of Transportation (Non-Medical): No   Physical Activity: Insufficiently Active (3/8/2024)    Exercise Vital Sign     Days of Exercise per Week: 2 days     Minutes of Exercise per Session: 30 min   Stress: No Stress Concern Present (3/8/2024)    [employer] [place] [employer] - Occupational Stress Questionnaire     Feeling of Stress : Only a little   Social Connections: Unknown (3/8/2024)    Social Connection and Isolation Panel [NHANES]     Frequency of Communication with Friends and Family: Three times a week     Frequency of Social Gatherings with Friends and Family: Three times a week     Active Member of Clubs or Organizations: No     Marital Status:    Housing Stability: Low Risk  (3/8/2024)    Housing Stability Vital Sign     Unable to Pay for Housing in the Last Year: No     Number of Places Lived in the Last Year: 1     Unstable Housing in the Last Year: No     Family History   Problem Relation Age of Onset    Osteoarthritis Mother     Stroke Father 86    Hyperlipidemia Brother         Tinnitus    Cancer Maternal Grandmother         lung    Cancer Maternal Grandfather         lung       Travel History: [place], [place] of [place], [place], [place], [place], [place], [place]  Vaccine History: yearly flu vaccine, MMR 2011, pneumovax 2008, prevnar 2014, eIPV 2011, typhoid 2011, 2014, HBV 1,2,3 and booster 2015, TdaP 2008,2021,  menactra 2017 2021,    zostavax 2017, shingrix 2018 x2, COVID vax x 2 feb  2021. And 2 in 2022.   Advanced Directive:   Safer Sex: abstinent  Bone Density: 8/2012 osteopenia, taking calcium plus D, 1/2019 improved  Colonoscopy: 8/2020(Dr. Vela, 4 polyps)  Due 2023    Review of Systems   All other systems reviewed and are negative.    Constitutional: No fever, chills, sweats, fatigue, weakness, weight loss.     Eyes: No change in vision, loss of vision, diplopia, photophobia. UTD eye exam    ENT: chronic and mild allergic rhinitis/seasonal allergies. No purulent sinus drainage, sore throat, mouth pain, or lesions. UTD dental exam.     Cardiovascular: No chest pain, AGRAWAL, palpitations or pedal edema.      Respiratory: No shortness of breath, AGRAWAL,      Gastrointestinal: No abdominal pain, nausea, vomiting, diarrhea,  Blood in stool    Genitourinary: No dysuria, hematuria, nocturia x1    Musculoskeletal: back is doing well. No other injuries,      Integumentary: no new skin lesions     Neurological: No dizziness, vertigo, unusual headaches, neuropathy,    Psychiatric: No   memory loss, no new sleep disturbance .   Having alcohol once week    Endocrine:  Thyroid normal. Monitoring his carb intake, less exercise   Lymphatic: No lymphadenopathy, blood loss, anemia, or malignancy    Objective:      There were no vitals taken for this visit. There is no height or weight on file to calculate BMI.  Physical Exam       General: Alert and attentive, cooperative and in no distress        HIV Table:   HLA-B 5701  negative   TOXOIgG     RPR 2/2023 non reactive   HEP A IgG 2/5/2013 pos, vaccinated   HBsAg     HBsAb 2/5/2016 positive, vax + booster   HBcAb     HBeAb     HBeAg     HCVAb 5/30/22 negative   HBV DNA     HCV RNA     Vitamin D 8/11/23 35, up from 29   Chlamydia / GC     TB Gold  2/2/23    Negative: IPPD yearly negative 2018 of the fact  PSA    2/15/23 1.7   CD4 count  2/2023  692  Hemoglobin A1c 8/2023  6.0%  [facility]      2/15/23 All sensitive       Recent Diagnostics: Reviewed lab         Assessment and Plan:           HIV positive  -     CBC Auto Differential; Future; Expected date: 03/15/2024  -     CD4 T-[place] Cells; Future; Expected date: 03/15/2024  -     Comprehensive Metabolic Panel; Future; Expected date: 03/15/2024  -     Lipid Panel; Future; Expected date: 03/15/2024  -     RPR; Future; Expected date: 03/15/2024  -     Quantiferon Gold TB; Future; Expected date: 03/15/2024  -     Hemoglobin A1C; Future; Expected date: 03/15/2024  -     PSA, SCREENING; Future; Expected date: 03/15/2024  -     abacavir-lamivudine (EPZICOM) 600-300 mg per tablet; Take 1 tablet by mouth once daily.  Dispense: 30 tablet; Refill: 11  -     atazanavir (REYATAZ) 200 MG Cap; Take 2 capsules (400 mg total) by mouth daily with breakfast.  Dispense: 60 capsule; Refill: 11    Hypercholesteremia  -     CBC Auto Differential; Future; Expected date: 03/15/2024  -     CD4 T-[place] Cells; Future; Expected date: 03/15/2024  -     Comprehensive Metabolic Panel; Future; Expected date: 03/15/2024  -     Lipid Panel; Future; Expected date: 03/15/2024  -     RPR; Future; Expected date: 03/15/2024  -     Quantiferon Gold TB; Future; Expected date: 03/15/2024  -     Hemoglobin A1C; Future; Expected date: 03/15/2024  -     PSA, SCREENING; Future; Expected date: 03/15/2024    Hypertension, unspecified type  -     CBC Auto Differential; Future; Expected date: 03/15/2024  -     CD4 T-[place] Cells; Future; Expected date: 03/15/2024  -     Comprehensive Metabolic Panel; Future; Expected date: 03/15/2024  -     Lipid Panel; Future; Expected date: 03/15/2024  -     RPR; Future; Expected date: 03/15/2024  -     Quantiferon Gold TB; Future; Expected date: 03/15/2024  -     Hemoglobin A1C; Future; Expected date: 03/15/2024  -     PSA, SCREENING; Future; Expected date: 03/15/2024    Posture abnormality    Colon cancer screening    Polyp of colon, unspecified part of colon, unspecified type  -     Case Request Endoscopy:  COLONOSCOPY        Counseled diet, A1C is elevated.  See me in person 3-4 weeks.  Reviewed all prior notes, labs, health maintenance, immunizations.    Will start to complete all.    Telehealth Visit     The patient location is:[facility]   The chief complaint leading to consultation is:   Visit type: Virtual visit  Total time spent with patient in counseling, reviewing labs, radiology, chart, documentation, coordination of care:  65       video virtual visit was related to patient preference/necessity.     Each patient to whom I provide medical services by telemedicine is:  (1) informed of the relationship between the physician and patient and the respective role of any other health care provider with respect to management of the patient; and (2) notified that they may decline to receive medical services by telemedicine and may withdraw from such care at any time.

## 2024-03-19 ENCOUNTER — PATIENT MESSAGE (OUTPATIENT)
Dept: INFECTIOUS DISEASES | Facility: CLINIC | Age: 64
End: 2024-03-19
Payer: MEDICARE

## 2024-03-19 ENCOUNTER — TELEPHONE (OUTPATIENT)
Dept: INFECTIOUS DISEASES | Facility: CLINIC | Age: 64
End: 2024-03-19
Payer: MEDICARE

## 2024-03-19 ENCOUNTER — LAB VISIT (OUTPATIENT)
Dept: LAB | Facility: HOSPITAL | Age: 64
End: 2024-03-19
Attending: INTERNAL MEDICINE
Payer: MEDICARE

## 2024-03-19 DIAGNOSIS — Z21 HIV POSITIVE: Primary | ICD-10-CM

## 2024-03-19 DIAGNOSIS — E78.00 HYPERCHOLESTEREMIA: ICD-10-CM

## 2024-03-19 DIAGNOSIS — I10 HYPERTENSION, UNSPECIFIED TYPE: ICD-10-CM

## 2024-03-19 DIAGNOSIS — Z21 HIV POSITIVE: ICD-10-CM

## 2024-03-19 LAB
ALBUMIN SERPL BCP-MCNC: 4 G/DL (ref 3.5–5.2)
ALP SERPL-CCNC: 78 U/L (ref 55–135)
ALT SERPL W/O P-5'-P-CCNC: 43 U/L (ref 10–44)
ANION GAP SERPL CALC-SCNC: 7 MMOL/L (ref 8–16)
AST SERPL-CCNC: 35 U/L (ref 10–40)
BASOPHILS # BLD AUTO: 0.04 K/UL (ref 0–0.2)
BASOPHILS NFR BLD: 0.7 % (ref 0–1.9)
BILIRUB SERPL-MCNC: 2 MG/DL (ref 0.1–1)
BUN SERPL-MCNC: 14 MG/DL (ref 8–23)
CALCIUM SERPL-MCNC: 10.1 MG/DL (ref 8.7–10.5)
CHLORIDE SERPL-SCNC: 105 MMOL/L (ref 95–110)
CHOLEST SERPL-MCNC: 160 MG/DL (ref 120–199)
CHOLEST/HDLC SERPL: 3.1 {RATIO} (ref 2–5)
CO2 SERPL-SCNC: 28 MMOL/L (ref 23–29)
COMPLEXED PSA SERPL-MCNC: 2 NG/ML (ref 0–4)
CREAT SERPL-MCNC: 1 MG/DL (ref 0.5–1.4)
DIFFERENTIAL METHOD BLD: ABNORMAL
EOSINOPHIL # BLD AUTO: 0.2 K/UL (ref 0–0.5)
EOSINOPHIL NFR BLD: 3.8 % (ref 0–8)
ERYTHROCYTE [DISTWIDTH] IN BLOOD BY AUTOMATED COUNT: 13.8 % (ref 11.5–14.5)
EST. GFR  (NO RACE VARIABLE): >60 ML/MIN/1.73 M^2
ESTIMATED AVG GLUCOSE: 134 MG/DL (ref 68–131)
GLUCOSE SERPL-MCNC: 120 MG/DL (ref 70–110)
HBA1C MFR BLD: 6.3 % (ref 4–5.6)
HCT VFR BLD AUTO: 47.4 % (ref 40–54)
HDLC SERPL-MCNC: 52 MG/DL (ref 40–75)
HDLC SERPL: 32.5 % (ref 20–50)
HGB BLD-MCNC: 15.4 G/DL (ref 14–18)
IMM GRANULOCYTES # BLD AUTO: 0.02 K/UL (ref 0–0.04)
IMM GRANULOCYTES NFR BLD AUTO: 0.3 % (ref 0–0.5)
LDLC SERPL CALC-MCNC: 87.6 MG/DL (ref 63–159)
LYMPHOCYTES # BLD AUTO: 2.1 K/UL (ref 1–4.8)
LYMPHOCYTES NFR BLD: 34.7 % (ref 18–48)
MCH RBC QN AUTO: 31.6 PG (ref 27–31)
MCHC RBC AUTO-ENTMCNC: 32.5 G/DL (ref 32–36)
MCV RBC AUTO: 97 FL (ref 82–98)
MONOCYTES # BLD AUTO: 0.8 K/UL (ref 0.3–1)
MONOCYTES NFR BLD: 13.4 % (ref 4–15)
NEUTROPHILS # BLD AUTO: 2.8 K/UL (ref 1.8–7.7)
NEUTROPHILS NFR BLD: 47.1 % (ref 38–73)
NONHDLC SERPL-MCNC: 108 MG/DL
NRBC BLD-RTO: 0 /100 WBC
PLATELET # BLD AUTO: 268 K/UL (ref 150–450)
PMV BLD AUTO: 9.1 FL (ref 9.2–12.9)
POTASSIUM SERPL-SCNC: 4.5 MMOL/L (ref 3.5–5.1)
PROT SERPL-MCNC: 7.5 G/DL (ref 6–8.4)
RBC # BLD AUTO: 4.88 M/UL (ref 4.6–6.2)
RPR SER QL: NORMAL
SODIUM SERPL-SCNC: 140 MMOL/L (ref 136–145)
TRIGL SERPL-MCNC: 102 MG/DL (ref 30–150)
WBC # BLD AUTO: 5.99 K/UL (ref 3.9–12.7)

## 2024-03-19 PROCEDURE — 86480 TB TEST CELL IMMUN MEASURE: CPT | Performed by: INTERNAL MEDICINE

## 2024-03-19 PROCEDURE — 84153 ASSAY OF PSA TOTAL: CPT | Performed by: INTERNAL MEDICINE

## 2024-03-19 PROCEDURE — 80053 COMPREHEN METABOLIC PANEL: CPT | Performed by: INTERNAL MEDICINE

## 2024-03-19 PROCEDURE — 36415 COLL VENOUS BLD VENIPUNCTURE: CPT | Mod: PO | Performed by: INTERNAL MEDICINE

## 2024-03-19 PROCEDURE — 87536 HIV-1 QUANT&REVRSE TRNSCRPJ: CPT | Performed by: INTERNAL MEDICINE

## 2024-03-19 PROCEDURE — 83036 HEMOGLOBIN GLYCOSYLATED A1C: CPT | Performed by: INTERNAL MEDICINE

## 2024-03-19 PROCEDURE — 80061 LIPID PANEL: CPT | Performed by: INTERNAL MEDICINE

## 2024-03-19 PROCEDURE — 85025 COMPLETE CBC W/AUTO DIFF WBC: CPT | Performed by: INTERNAL MEDICINE

## 2024-03-19 PROCEDURE — 86361 T CELL ABSOLUTE COUNT: CPT | Performed by: INTERNAL MEDICINE

## 2024-03-19 PROCEDURE — 86592 SYPHILIS TEST NON-TREP QUAL: CPT | Performed by: INTERNAL MEDICINE

## 2024-03-20 LAB
CD3+CD4+ CELLS # BLD: 718 CELLS/UL (ref 300–1400)
CD3+CD4+ CELLS NFR BLD: 34.7 % (ref 28–57)
GAMMA INTERFERON BACKGROUND BLD IA-ACNC: 0.07 IU/ML
HIV1 RNA # SERPL NAA+PROBE: <20 COPIES/ML
HIV1 RNA SERPL NAA+PROBE-LOG#: <1.3 LOG COPIES/ML
HIV1 RNA SERPL QL NAA+PROBE: DETECTED
M TB IFN-G CD4+ BCKGRND COR BLD-ACNC: -0.01 IU/ML
M TB IFN-G CD4+ BCKGRND COR BLD-ACNC: 0 IU/ML
MITOGEN IGNF BCKGRD COR BLD-ACNC: 9.93 IU/ML
TB GOLD PLUS: NEGATIVE

## 2024-04-08 ENCOUNTER — OFFICE VISIT (OUTPATIENT)
Dept: INFECTIOUS DISEASES | Facility: CLINIC | Age: 64
End: 2024-04-08
Payer: MEDICARE

## 2024-04-08 VITALS
SYSTOLIC BLOOD PRESSURE: 136 MMHG | HEART RATE: 68 BPM | WEIGHT: 208.75 LBS | BODY MASS INDEX: 28.32 KG/M2 | DIASTOLIC BLOOD PRESSURE: 88 MMHG | TEMPERATURE: 99 F

## 2024-04-08 DIAGNOSIS — E55.9 VITAMIN D DEFICIENCY: ICD-10-CM

## 2024-04-08 DIAGNOSIS — Z21 HIV POSITIVE: Primary | ICD-10-CM

## 2024-04-08 DIAGNOSIS — E78.00 HYPERCHOLESTEREMIA: ICD-10-CM

## 2024-04-08 DIAGNOSIS — Z12.5 SCREENING FOR PROSTATE CANCER: ICD-10-CM

## 2024-04-08 DIAGNOSIS — I10 HYPERTENSION, UNSPECIFIED TYPE: ICD-10-CM

## 2024-04-08 DIAGNOSIS — K63.5 POLYP OF COLON, UNSPECIFIED PART OF COLON, UNSPECIFIED TYPE: ICD-10-CM

## 2024-04-08 DIAGNOSIS — R73.09 ELEVATED GLUCOSE: ICD-10-CM

## 2024-04-08 PROCEDURE — 99999 PR PBB SHADOW E&M-EST. PATIENT-LVL III: CPT | Mod: PBBFAC,,, | Performed by: INTERNAL MEDICINE

## 2024-04-08 PROCEDURE — 3008F BODY MASS INDEX DOCD: CPT | Mod: CPTII,S$GLB,, | Performed by: INTERNAL MEDICINE

## 2024-04-08 PROCEDURE — 99214 OFFICE O/P EST MOD 30 MIN: CPT | Mod: S$GLB,,, | Performed by: INTERNAL MEDICINE

## 2024-04-08 PROCEDURE — 3044F HG A1C LEVEL LT 7.0%: CPT | Mod: CPTII,S$GLB,, | Performed by: INTERNAL MEDICINE

## 2024-04-08 PROCEDURE — 4010F ACE/ARB THERAPY RXD/TAKEN: CPT | Mod: CPTII,S$GLB,, | Performed by: INTERNAL MEDICINE

## 2024-04-08 PROCEDURE — 1159F MED LIST DOCD IN RCRD: CPT | Mod: CPTII,S$GLB,, | Performed by: INTERNAL MEDICINE

## 2024-04-08 PROCEDURE — 3075F SYST BP GE 130 - 139MM HG: CPT | Mod: CPTII,S$GLB,, | Performed by: INTERNAL MEDICINE

## 2024-04-08 PROCEDURE — 3079F DIAST BP 80-89 MM HG: CPT | Mod: CPTII,S$GLB,, | Performed by: INTERNAL MEDICINE

## 2024-04-08 RX ORDER — BICTEGRAVIR SODIUM, EMTRICITABINE, AND TENOFOVIR ALAFENAMIDE FUMARATE 50; 200; 25 MG/1; MG/1; MG/1
1 TABLET ORAL DAILY
Qty: 90 TABLET | Refills: 3 | Status: SHIPPED | OUTPATIENT
Start: 2024-04-08 | End: 2025-04-08

## 2024-04-08 NOTE — PROGRESS NOTES
Subjective:       Patient ID: Griffin Valenzuela is a 64 y.o. male.    Chief Complaint:: Follow-up    Here today for in person visit and follow up.  Works here at [employer].    Difficulty with diet related to sweets.   Would like all meds to go to [facility].    From my initial visit 3/15/24:  Seeing me today to establish care. His ID MD retired in December.  HIV well controlled. No issues taking or obtaining meds.     Concerned about scoliosis and occasional flares with back pain. Usually happens twice a year- bed bound and takes hydrocodone.  No urgent issues today.    . Has worked in HIV care for years- [facility], [facility]. Now Peds palliative care here.    From Dr. Rodriguez 8/16/23 last note:  12/7/19: Drinking again , same as before, 4 times a week, 4 drinks per night. Stress at work.   Visited [place] in NOvember  Did have his flu vaccine, and at least one shingrix([employer])  No visits to PCP since last  No labs prior to this visit  Had an URI last week with fever, runny eyes and nose, cough. Rested and it resolved.     6/14/19: only complaint is a flare of his back pain. He has plenty of old hydrocodone to use. Encouraged him to use NSAID as well. His BP is always high after driving from [place]. Repeat was 140/94. His outpatient blood pressures are largely 125/80 (his record sent to us, measured by nurse at work). He did receive the second shingles vaccine. His bone density was improved compared to 2012. He was abstinent from alcohol for 4 months and resumed drinking. Encouraged moderation, because of lipids and BS. Reviewed lab, lipids are better. VL is detected less than 40, same as last summer. Adherence is excellent.     12/20/19:  He is under a great deal of stress because he is looking for new job, [place] is extra stressful and he just had to drive here from [place].  His blood pressure is always elevated when he is here but he blames it on the stress of the drive.  He has not been measuring his blood  pressure as often and today it is just too high to ignore.  He rides his bike almost every day and is very fit, has No cardiovascular symptoms.  He is drinking more than usual because of the stress and he knows it is not healthy.  His liver enzymes are slightly elevated as is the bilirubin and his blood sugar is a bit higher is consistent with pre diabetes.  Viral load is less than 40 but detected and he reports missing his medication approximately twice per month.  He takes it in the morning with food and sometimes does not have time for breakfast.  He is also in a hurry today.    1/31/20: here for a blood pressure check and the measurements are much improved on lisinopril 10 mg and abstinence from alcohol. Not really being mindful about food carbs? Stress from [place] has resolved. BP good even though he drove here(which usually elevates BP a lot). Reviewed lab and his VL is now undetectable. He had interruption of epzicom for 4 days.     6/18/20: usual HIV follow up. He is having flare up of back pain. Taking hydrocodone without relief. Took ibuprofen OTC in the past, but not lately. He has been sleeping on his stomach a little more lately which may be a factor. He does a little stretching when this occurs. Last visit with an orthopedist for his back was 1990ish. No recognized muscle spasm and no pain in the leg. He associates this with scoliosis.   Reviewed labs and bilirubin 2.1. He has not had any alcohol since mardi gras. Negative abd US 3 years ago. Has been riding his bike 2x a week, since he is working from home. He has been protecting himself from COVID. He is limiting white carbs. Discussed A1c . Lipids much better. He saw ENt about the spot on his palate and she is observing it with recheck(Dr. Kylah cardona)     12/17/20: blood pressure is normal!!  Has had f/u with ENT 10/2020 with no change and f/u 1 year. Still exercising on weekend. Still abstinent from alcohol.!  Did receive a flu shot.      6/24/21: still abstinent from alcohol. Had both COVID vaccines, BP perfect. Had a slip and head injury and received stitches. 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 Hem A1c is up to 6.6%, avg 143. He admits to cookies and chips and riding bike only twice a week.  He removed anticipates and improvement in his A1c by moving to this new job and he will make more of an effort to need  reduce unhealthy foods and does not want to start a medication yet    12/15/21: most recent flare of back pain prompted referral to Neurosurgery, who has ordered an MRI and physical therapy. He has been awaiting the PT but in the interim he has had acupuncture with good results.  Not needing pain medication since then.  MRI is not been scheduled the was ordered on 12/02.  Still abstinent from alcohol.   ENT f/u states that mouth lesion is stable and yearly f/u. Went to the dentist 3 weeks ago.  Has lost 15 lb since last visit, likely some of which is intentional in some of which is loss of muscle mass from decreased activity due to back pain.    5/30/22: his job is ending in a week. He is considering his options. He feels good.     2/15/23: since last visit, he has gone to [employer]. 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 He is no longer established with primary care. Has been on epzicom and reyataz since prior to 2011, and possibly from the time of diagnosis.  The last year of viral loads have been just above 20. His compliance has always been excellent.     8/16/23: reviewed lab and viral load was 89. He had a gap in meds a few weeks ago and he was off meds for 4 days. Blip in feb, and archive was all sensitive. He is established with [facility]'s pharmacy again. Vitamin d is better, but it has been less than 3 months on weekly replacement of 50k. Still exercising, but heat makes it unpleasant  Walks at work. A1c is improved. We discussed who he will see in the  future.       Current Outpatient Medications:     calcium-vitamin D3 (OS-ESTRELLA 500 + D3) 500 mg(1,250mg) -200 unit per tablet, Take 1 tablet by mouth once daily., Disp: , Rfl:     fish oil-omega-3 fatty acids 300-1,000 mg capsule, Take 2 capsules by mouth once daily., Disp: , Rfl:     HYDROcodone-acetaminophen (NORCO) 5-325 mg per tablet, Take 1 tablet by mouth every 6 (six) hours as needed for Pain., Disp: 28 tablet, Rfl: 0    lisinopriL 10 MG tablet, Take 1 tablet (10 mg total) by mouth once daily., Disp: 90 tablet, Rfl: 3    rosuvastatin (CRESTOR) 10 MG tablet, Take 1 tablet (10 mg total) by mouth once daily., Disp: 90 tablet, Rfl: 3    bbworhmhf-wpbfcfjq-veyqlfn ala (BIKTARVY) -25 mg (25 kg or greater), Take 1 tablet by mouth once daily., Disp: 90 tablet, Rfl: 3     Review of patient's allergies indicates:  No Known Allergies  Past Medical History:   Diagnosis Date    Hepatitis A     serology positive    History of MRSA infection     Recurring    HIV positive 2008    Hypercholesteremia 2011    Hyperglycemia     Hypertension     LFTs abnormal     Mild    Scoliosis     With occasional back pain    Sleep apnea     Non compliant with CPAP     Past Surgical History:   Procedure Laterality Date    COLONOSCOPY  08/2010    COLONOSCOPY N/A 8/28/2020    Procedure: COLONOSCOPY;  Surgeon: Dwayne Vela MD;  Location: [facility];  Service: Endoscopy;  Laterality: N/A;    ELBOW FRACTURE SURGERY  1968    HEMORRHOID SURGERY N/A 1980    TONSILLECTOMY  1964     Social History     Socioeconomic History    Marital status: Single   Occupational History    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  Rates diet as  good.  He is not satisfied with weight.         Social Determinants of Health     Financial Resource Strain: Low Risk  (3/8/2024)    Overall Financial Resource Strain (CARDIA)     Difficulty of Paying Living Expenses: Not hard at all   Food Insecurity: No Food Insecurity (3/8/2024)    Hunger Vital Sign     Worried About Running Out of Food in the Last Year: Never true     Ran Out of Food in the Last Year: Never true   Transportation Needs: No Transportation Needs (3/8/2024)    PRAPARE - Transportation     Lack of Transportation (Medical): No     Lack of Transportation (Non-Medical): No   Physical Activity: Insufficiently Active (3/8/2024)    Exercise Vital Sign     Days of Exercise per Week: 2 days     Minutes of Exercise per Session: 30 min   Stress: No Stress Concern Present (3/8/2024)    [place] [employer] - Occupational Stress Questionnaire     Feeling of Stress : Only a little   Social Connections: Unknown (3/8/2024)    Social Connection and Isolation Panel [NHANES]     Frequency of Communication with Friends and Family: Three times a week     Frequency of Social Gatherings with Friends and Family: Three times a week     Active Member of Clubs or Organizations: No     Marital Status:    Housing Stability: Low Risk  (3/8/2024)    Housing Stability Vital Sign     Unable to Pay for Housing in the Last Year: No     Number of Places Lived in the Last Year: 1     Unstable Housing in the Last Year: No     Family History   Problem Relation Name Age of Onset    Osteoarthritis Mother      Stroke Father  86    Hyperlipidemia Brother          Tinnitus    Cancer Maternal Grandmother          lung    Cancer Maternal Grandfather          lung       Travel History: [place], [place] of [place], [place], [place], [place], [place], [place]  Vaccine History: yearly flu vaccine, MMR 2011, pneumovax 2008, prevnar 2014, eIPV 2011, typhoid 2011, 2014, HBV 1,2,3 and booster 2015, TdaP  2008,2021,  menactra 2017 2021,    zostavax 2017, shingrix 2018 x2, COVID vax x 2 feb 2021. And 2 in 2022.   Advanced Directive:   Safer Sex: abstinent  Bone Density: 8/2012 osteopenia, taking calcium plus D, 1/2019 improved  Colonoscopy: 8/2020(Dr. Vela, 4 polyps)  Due 2023    Review of Systems   Constitutional: Negative.    HENT:  Positive for congestion.    Eyes: Negative.    Respiratory: Negative.     Cardiovascular: Negative.    Gastrointestinal: Negative.    Endocrine: Negative.    Genitourinary: Negative.    Musculoskeletal:  Positive for back pain.   Neurological: Negative.    Hematological: Negative.    Psychiatric/Behavioral: Negative.         Constitutional: No fever, chills, sweats, fatigue, weakness, weight loss.     Eyes: No change in vision, loss of vision, diplopia, photophobia. UTD eye exam    ENT: chronic and mild allergic rhinitis/seasonal allergies. No purulent sinus drainage, sore throat, mouth pain, or lesions. UTD dental exam.     Cardiovascular: No chest pain, AGRAWAL, palpitations or pedal edema.      Respiratory: No shortness of breath, AGRAWAL,      Gastrointestinal: No abdominal pain, nausea, vomiting, diarrhea,  Blood in stool    Genitourinary: No dysuria, hematuria, nocturia x1    Musculoskeletal: back is doing well. No other injuries,      Integumentary: no new skin lesions     Neurological: No dizziness, vertigo, unusual headaches, neuropathy,    Psychiatric: No   memory loss, no new sleep disturbance .   Having alcohol once week    Endocrine:  Thyroid normal. Monitoring his carb intake, less exercise   Lymphatic: No lymphadenopathy, blood loss, anemia, or malignancy    Objective:      Blood pressure 136/88, pulse 68, temperature 98.7 °F (37.1 °C), weight 94.7 kg (208 lb 12.4 oz). Body mass index is 28.32 kg/m².  Physical Exam  Vitals and nursing note reviewed.   Constitutional:       General: He is not in acute distress.     Appearance: Normal appearance. He is well-developed. He is not  toxic-appearing or diaphoretic.   HENT:      Head: Normocephalic and atraumatic. No right periorbital erythema or left periorbital erythema.      Right Ear: External ear normal.      Left Ear: External ear normal.      Nose: Nose normal.      Mouth/Throat:      Mouth: Mucous membranes are moist.   Eyes:      General: Lids are normal. No scleral icterus.        Right eye: No discharge.         Left eye: No discharge.      Conjunctiva/sclera:      Right eye: Right conjunctiva is not injected.      Left eye: Left conjunctiva is not injected.      Pupils: Pupils are equal, round, and reactive to light.   Cardiovascular:      Rate and Rhythm: Normal rate and regular rhythm.   Pulmonary:      Effort: Pulmonary effort is normal. No tachypnea or respiratory distress.      Breath sounds: No stridor.   Abdominal:      General: There is no distension.      Palpations: Abdomen is soft.   Musculoskeletal:         General: Normal range of motion.      Cervical back: Normal range of motion. No erythema.   Skin:     General: Skin is warm and dry.      Findings: No erythema or rash.   Neurological:      Mental Status: He is alert and oriented to person, place, and time.      Cranial Nerves: No cranial nerve deficit.      Coordination: Coordination normal.   Psychiatric:         Speech: Speech normal.         Behavior: Behavior normal. Behavior is cooperative.         Thought Content: Thought content normal.         Judgment: Judgment normal.              HIV Table:   HLA-B 5701  negative   TOXOIgG     RPR 2/2023 non reactive   HEP A IgG 2/5/2013 pos, vaccinated   HBsAg     HBsAb 2/5/2016 positive, vax + booster   HBcAb     HBeAb     HBeAg     HCVAb 5/30/22 negative   HBV DNA     HCV RNA     Vitamin D 8/11/23 35, up from 29   Chlamydia / GC     TB Gold  2/2/23    Negative: IPPD yearly negative 2018 of the fact  PSA    2/15/23 1.7   CD4 count  2/2023  692  Hemoglobin A1c 8/2023  6.0%  Genosure ARchive      2/15/23 All sensitive        Recent Diagnostics: Reviewed lab        Assessment and Plan:           HIV positive  -     kzsjglnxp-dkfrugpg-cbsgame ala (BIKTARVY) -25 mg (25 kg or greater); Take 1 tablet by mouth once daily.  Dispense: 90 tablet; Refill: 3  -     Comprehensive Metabolic Panel; Standing  -     CBC Auto Differential; Standing  -     CD4 T-[place] Cells; Standing  -     RPR; Standing  -     Quantiferon Gold TB; Standing  -     HIV RNA, Quantitative, PCR; Standing    Hypercholesteremia  -     Lipid Panel; Standing    Hypertension, unspecified type    Polyp of colon, unspecified part of colon, unspecified type    Screening for prostate cancer  -     PSA, Screening; Standing    Vitamin D deficiency  -     Vitamin D; Standing    Elevated glucose  -     Hemoglobin A1C; Standing            Reviewed all prior notes, labs, health maintenance, immunizations.    RSV, covid, flu vaccines in fall.  RTC in 6 months with labs prior.

## 2024-04-08 NOTE — Clinical Note
Changing to biktarvy in 2 weeks so will need cbc, cmp, vl at [facility] in 8 weeks. RTC in 6 months with all labs including A1C pls.

## 2024-05-20 ENCOUNTER — CLINICAL SUPPORT (OUTPATIENT)
Dept: ENDOSCOPY | Facility: HOSPITAL | Age: 64
End: 2024-05-20
Attending: INTERNAL MEDICINE
Payer: MEDICARE

## 2024-05-20 DIAGNOSIS — Z12.11 COLON CANCER SCREENING: ICD-10-CM

## 2024-05-20 NOTE — PLAN OF CARE
We attempted to reach you to Schedule procedure. Please call back at [phone]. Left Voicemail msg. For pt. And MY O msg. Sent for pt. To call back to schedule.

## 2024-05-21 ENCOUNTER — TELEPHONE (OUTPATIENT)
Dept: ENDOSCOPY | Facility: HOSPITAL | Age: 64
End: 2024-05-21
Payer: MEDICARE

## 2024-05-21 DIAGNOSIS — Z12.11 COLON CANCER SCREENING: Primary | ICD-10-CM

## 2024-05-21 NOTE — TELEPHONE ENCOUNTER
Miss HOLLOWAY MRN: [id number] CALLING TO SCHEDULE PROCEDURE      Contacted the patient to schedule an endoscopy procedure(s) . The patient did not answer the call and left a voice message requesting a call back.

## 2024-05-21 NOTE — TELEPHONE ENCOUNTER
Rec'd VM that pt returning call from VM and missed PAT. Entered 2nd refendo and have call in  list.

## 2024-05-29 ENCOUNTER — CLINICAL SUPPORT (OUTPATIENT)
Dept: ENDOSCOPY | Facility: HOSPITAL | Age: 64
End: 2024-05-29
Attending: INTERNAL MEDICINE
Payer: MEDICARE

## 2024-05-29 ENCOUNTER — TELEPHONE (OUTPATIENT)
Dept: ENDOSCOPY | Facility: HOSPITAL | Age: 64
End: 2024-05-29

## 2024-05-29 VITALS — HEIGHT: 72 IN | WEIGHT: 208 LBS | BODY MASS INDEX: 28.17 KG/M2

## 2024-05-29 DIAGNOSIS — Z12.11 COLON CANCER SCREENING: ICD-10-CM

## 2024-05-29 RX ORDER — SODIUM, POTASSIUM,MAG SULFATES 17.5-3.13G
1 SOLUTION, RECONSTITUTED, ORAL ORAL DAILY
Qty: 1 KIT | Refills: 0 | Status: SHIPPED | OUTPATIENT
Start: 2024-05-29 | End: 2024-05-31

## 2024-05-29 NOTE — TELEPHONE ENCOUNTER
Spoke to pt to schedule procedure(s) Colonoscopy       Physician to perform procedure(s) Dr. JAYNE Robison  Date of Procedure (s) 8/23/24  Arrival Time 6:30 AM  Time of Procedure(s) 7:30 AM   Location of Procedure(s) [place] 4th Floor  Type of Rx Prep sent to patient: Suprep  Instructions provided to patient via [facility]    Patient was informed on the following information and verbalized understanding. Screening questionnaire reviewed with patient and complete. If procedure requires anesthesia, a responsible adult needs to be present to accompany the patient home, patient cannot drive after receiving anesthesia. Appointment details are tentative, especially check-in time. Patient will receive a prep-op call 7 days prior to confirm check-in time for procedure. If applicable the patient should contact their pharmacy to verify Rx for procedure prep is ready for pick-up. Patient was advised to call the scheduling department at [phone] if pharmacy states no Rx is available. Patient was advised to call the endoscopy scheduling department if any questions or concerns arise.      [facility] Endoscopy Scheduling Department

## 2024-08-16 ENCOUNTER — PATIENT MESSAGE (OUTPATIENT)
Dept: ENDOSCOPY | Facility: HOSPITAL | Age: 64
End: 2024-08-16
Payer: MEDICARE

## 2024-08-16 ENCOUNTER — TELEPHONE (OUTPATIENT)
Dept: ENDOSCOPY | Facility: HOSPITAL | Age: 64
End: 2024-08-16
Payer: MEDICARE

## 2024-08-16 NOTE — TELEPHONE ENCOUNTER
Contacted Pt for Endoscopy precall to confirm scheduled procedure(s) Colonoscopy on 8/23/24. Pt did not answer. Left voicemail for pt to call Endoscopy Scheduling to confirm.

## 2024-08-20 ENCOUNTER — TELEPHONE (OUTPATIENT)
Dept: ENDOSCOPY | Facility: HOSPITAL | Age: 64
End: 2024-08-20
Payer: MEDICARE

## 2024-08-20 NOTE — TELEPHONE ENCOUNTER
Spoke to patient for pre-call to confirm scheduled Colonoscopy and patient verbalized understanding of the following:       Date of Procedure (s)  verified 8/23/24  Arrival Time 6:30 AM verified.  Location of Procedure(s) [place] 4th Floor verified.  NPO status reinforced. Ok to continue clear liquids up until 4 hours prior to the Endoscopy procedure.   Patient denies taking any blood thinners / [facility]  Pt confirmed receipt of prep instructions and Rx prep (if applicable).  Instructions provided to patient via [facility]  Pt confirmed ride home after procedure if procedure requires anesthesia.   Pre-call screening questionnaire reviewed and completed with patient.   Appointment details are tentative, including check-in time.  If the patient begins taking any blood thinning medications, injectable weight loss/diabetes medications (other than insulin), or Adipex (phentermine) patient was instructed to contact the endoscopy scheduling department as soon as possible.  Patient was advised to call the endoscopy scheduling department if any questions or concerns arise.       [facility] Endoscopy Scheduling Department

## 2024-08-22 RX ORDER — SODIUM CHLORIDE 9 MG/ML
INJECTION, SOLUTION INTRAVENOUS CONTINUOUS
OUTPATIENT
Start: 2024-08-22

## 2024-08-22 RX ORDER — SODIUM CHLORIDE 0.9 % (FLUSH) 0.9 %
10 SYRINGE (ML) INJECTION
OUTPATIENT
Start: 2024-08-22

## 2024-08-23 ENCOUNTER — HOSPITAL ENCOUNTER (OUTPATIENT)
Facility: HOSPITAL | Age: 64
Discharge: HOME | End: 2024-08-23
Attending: INTERNAL MEDICINE | Admitting: INTERNAL MEDICINE
Payer: MEDICARE

## 2024-08-23 ENCOUNTER — ANESTHESIA EVENT (OUTPATIENT)
Dept: ENDOSCOPY | Facility: HOSPITAL | Age: 64
End: 2024-08-23
Payer: MEDICARE

## 2024-08-23 ENCOUNTER — ANESTHESIA (OUTPATIENT)
Dept: ENDOSCOPY | Facility: HOSPITAL | Age: 64
End: 2024-08-23
Payer: MEDICARE

## 2024-08-23 VITALS
HEIGHT: 72 IN | SYSTOLIC BLOOD PRESSURE: 127 MMHG | WEIGHT: 205 LBS | DIASTOLIC BLOOD PRESSURE: 76 MMHG | TEMPERATURE: 97 F | OXYGEN SATURATION: 98 % | RESPIRATION RATE: 18 BRPM | BODY MASS INDEX: 27.77 KG/M2 | HEART RATE: 59 BPM

## 2024-08-23 DIAGNOSIS — Z12.11 COLON CANCER SCREENING: ICD-10-CM

## 2024-08-23 DIAGNOSIS — Z86.010 ENCOUNTER FOR COLONOSCOPY DUE TO HISTORY OF COLONIC POLYP: Primary | ICD-10-CM

## 2024-08-23 DIAGNOSIS — Z12.11 ENCOUNTER FOR COLONOSCOPY DUE TO HISTORY OF COLONIC POLYP: Primary | ICD-10-CM

## 2024-08-23 PROCEDURE — 25000003 PHARM REV CODE 250: Performed by: NURSE ANESTHETIST, CERTIFIED REGISTERED

## 2024-08-23 PROCEDURE — 37000009 HC ANESTHESIA EA ADD 15 MINS: Performed by: INTERNAL MEDICINE

## 2024-08-23 PROCEDURE — 45385 COLONOSCOPY W/LESION REMOVAL: CPT | Mod: 22,PT | Performed by: INTERNAL MEDICINE

## 2024-08-23 PROCEDURE — 45385 COLONOSCOPY W/LESION REMOVAL: CPT | Mod: 22,PT,, | Performed by: INTERNAL MEDICINE

## 2024-08-23 PROCEDURE — 27201089 HC SNARE, DISP (ANY): Performed by: INTERNAL MEDICINE

## 2024-08-23 PROCEDURE — 27201012 HC FORCEPS, HOT/COLD, DISP: Performed by: INTERNAL MEDICINE

## 2024-08-23 PROCEDURE — 37000008 HC ANESTHESIA 1ST 15 MINUTES: Performed by: INTERNAL MEDICINE

## 2024-08-23 PROCEDURE — 88305 TISSUE EXAM BY PATHOLOGIST: CPT | Performed by: PATHOLOGY

## 2024-08-23 PROCEDURE — 63600175 PHARM REV CODE 636 W HCPCS: Performed by: NURSE ANESTHETIST, CERTIFIED REGISTERED

## 2024-08-23 RX ORDER — PROPOFOL 10 MG/ML
VIAL (ML) INTRAVENOUS CONTINUOUS PRN
Status: DISCONTINUED | OUTPATIENT
Start: 2024-08-23 | End: 2024-08-23

## 2024-08-23 RX ORDER — SODIUM CHLORIDE 9 MG/ML
INJECTION, SOLUTION INTRAVENOUS CONTINUOUS
Status: DISCONTINUED | OUTPATIENT
Start: 2024-08-23 | End: 2024-08-23 | Stop reason: HOSPADM

## 2024-08-23 RX ORDER — LIDOCAINE HYDROCHLORIDE 20 MG/ML
INJECTION INTRAVENOUS
Status: DISCONTINUED | OUTPATIENT
Start: 2024-08-23 | End: 2024-08-23

## 2024-08-23 RX ORDER — ATAZANAVIR 200 MG/1
300 CAPSULE ORAL
COMMUNITY

## 2024-08-23 RX ADMIN — PROPOFOL 150 MCG/KG/MIN: 10 INJECTION, EMULSION INTRAVENOUS at 07:08

## 2024-08-23 RX ADMIN — SODIUM CHLORIDE: 9 INJECTION, SOLUTION INTRAVENOUS at 07:08

## 2024-08-23 RX ADMIN — PROPOFOL 50 MG: 10 INJECTION, EMULSION INTRAVENOUS at 07:08

## 2024-08-23 RX ADMIN — LIDOCAINE HYDROCHLORIDE 50 MG: 20 INJECTION INTRAVENOUS at 07:08

## 2024-08-23 RX ADMIN — PROPOFOL 30 MG: 10 INJECTION, EMULSION INTRAVENOUS at 07:08

## 2024-08-23 NOTE — ANESTHESIA PREPROCEDURE EVALUATION
08/23/2024  Griffin Valenzuela is a 64 y.o., male.      Pre-op Assessment    I have reviewed the Patient Summary Reports.     I have reviewed the Nursing Notes. I have reviewed the NPO Status.   I have reviewed the Medications.     Review of Systems  Cardiovascular:     Hypertension                                        Pulmonary:         Denies Sleep Apnea. Does not use CPAP at home               Hepatic/GI:      Liver Disease, Hepatitis             Patient Active Problem List   Diagnosis    HIV positive    Hypercholesteremia    Osteopenia    SHELBIE (obstructive sleep apnea)    Colon cancer screening    Decreased range of motion of lumbar spine    Posture abnormality    History of MRSA infection    Hypertension    Polyp of colon     Past Medical History:   Diagnosis Date    Hepatitis A     serology positive    History of MRSA infection     Recurring    HIV positive 2008    Hypercholesteremia 2011    Hyperglycemia     Hypertension     LFTs abnormal     Mild    Scoliosis     With occasional back pain    Sleep apnea     Non compliant with CPAP     Past Surgical History:   Procedure Laterality Date    COLONOSCOPY  08/2010    COLONOSCOPY N/A 8/28/2020    Procedure: COLONOSCOPY;  Surgeon: Dwayne Vela MD;  [facility];  Service: Endoscopy;  Laterality: N/A;    ELBOW FRACTURE SURGERY  1968    HEMORRHOID SURGERY N/A 1980    TONSILLECTOMY  1964       Physical Exam  General: Well nourished, Cooperative, Alert and Oriented    Airway:  Mallampati: I / I  Mouth Opening: Normal  TM Distance: Normal  Tongue: Normal  Neck ROM: Normal ROM    Dental:  Intact    Chest/Lungs:  Clear to auscultation, Normal Respiratory Rate    Heart:  Rate: Normal  Rhythm: Regular Rhythm  Sounds: Normal        Anesthesia Plan  Type of Anesthesia, risks & benefits discussed:    Anesthesia Type: Gen Natural Airway  Intra-op Monitoring Plan:  Standard ASA Monitors  Induction:  IV  Informed Consent: Informed consent signed with the Patient and all parties understand the risks and agree with anesthesia plan.  All questions answered.   ASA Score: 2    Ready For Surgery From Anesthesia Perspective.     .

## 2024-08-23 NOTE — [employer] PATIENT INSTRUCTIONS
Discharge Summary/Instructions after an Endoscopic Procedure  Patient Name: Griffin Valenzuela  Patient MRN: [id number]  Patient YOB: 1960 Friday, August 23, 2024  Eleazar Robison MD  Dear patient,  As a result of recent federal legislation (The Federal Cures Act), you may   receive lab or pathology results from your procedure in your [facility]   account before your physician is able to contact you. Your physician or   their representative will relay the results to you with their   recommendations at their soonest availability.  Thank you,  RESTRICTIONS:  During your procedure today, you received medications for sedation.  These   medications may affect your judgment, balance and coordination.  Therefore,   for 24 hours, you have the following restrictions:   - DO NOT drive a car, operate machinery, make legal/financial decisions,   sign important papers or drink alcohol.    ACTIVITY:  Today: no heavy lifting, straining or running due to procedural   sedation/anesthesia.  The following day: return to full activity including work.  DIET:  Eat and drink normally unless instructed otherwise.     TREATMENT FOR COMMON SIDE EFFECTS:  - Mild abdominal pain, nausea, belching, bloating or excessive gas:  rest,   eat lightly and use a heating pad.  - Sore Throat: treat with throat lozenges and/or gargle with warm salt   water.  - Because air was used during the procedure, expelling large amounts of air   from your rectum or belching is normal.  - If a bowel prep was taken, you may not have a bowel movement for 1-3 days.    This is normal.  SYMPTOMS TO WATCH FOR AND REPORT TO YOUR PHYSICIAN:  1. Abdominal pain or bloating, other than gas cramps.  2. Chest pain.  3. Back pain.  4. Signs of infection such as: chills or fever occurring within 24 hours   after the procedure.  5. Rectal bleeding, which would show as bright red, maroon, or black stools.   (A tablespoon of blood from the rectum is not serious, especially if    hemorrhoids are present.)  6. Vomiting.  7. Weakness or dizziness.  GO DIRECTLY TO THE NEAREST EMERGENCY ROOM IF YOU HAVE ANY OF THE FOLLOWING:      Difficulty breathing              Chills and/or fever over 101 F   Persistent vomiting and/or vomiting blood   Severe abdominal pain   Severe chest pain   Black, tarry stools   Bleeding- more than one tablespoon   Any other symptom or condition that you feel may need urgent attention  Your doctor recommends these additional instructions:  If any biopsies were taken, your doctors clinic will contact you in 1 to 2   weeks with any results.  - Patient has a contact number available for emergencies.  The signs and   symptoms of potential delayed complications were discussed with the   patient.  Return to normal activities tomorrow.  Written discharge   instructions were provided to the patient.   - Discharge patient to home (ambulatory).   - Resume previous diet.   - Continue present medications.   - Await pathology results.   - Repeat colonoscopy in 1 year for surveillance of multiple polyps.   - Return to referring physician.  For questions, problems or results please call your physician - Eelazar Robison MD at Work:  [phone].  [facility] [place], EMERGENCY ROOM PHONE NUMBER: [phone]  IF A COMPLICATION OR EMERGENCY SITUATION ARISES AND YOU ARE UNABLE TO REACH   YOUR PHYSICIAN - GO DIRECTLY TO THE EMERGENCY ROOM.  Eleazar Robison MD  8/23/2024 8:25:36 AM  This report has been verified and signed electronically.  Dear patient,  As a result of recent federal legislation (The Federal Cures Act), you may   receive lab or pathology results from your procedure in your [facility]   account before your physician is able to contact you. Your physician or   their representative will relay the results to you with their   recommendations at their soonest availability.  Thank you,  [employer]

## 2024-08-23 NOTE — TRANSFER OF CARE
Anesthesia Transfer of Care Note    Patient: Griffin Valenzuela    Procedure(s) Performed: Procedure(s) (LRB):  COLONOSCOPY (N/A)    Patient location: OPS    Anesthesia Type: general    Transport from OR: Transported from OR on room air with adequate spontaneous ventilation    Post pain: adequate analgesia    Post assessment: no apparent anesthetic complications    Post vital signs: stable    Level of consciousness: sedated    Nausea/Vomiting: no nausea/vomiting    Complications: none    Transfer of care protocol was followed      Last vitals: Visit Vitals  /57   Pulse 70   Temp 36.9 °C (98.4 °F) (Temporal)   Resp 17   Ht 6' (1.829 m)   Wt 93 kg (205 lb)   SpO2 95%   BMI 27.80 kg/m²

## 2024-08-23 NOTE — H&P
Short Stay Endoscopy History and Physical    PCP - Rosa Mann MD (Inactive)    Procedure - Colonoscopy  ASA - per anesthesia  Mallampati - per anesthesia  History of Anesthesia problems - no  Family history Anesthesia problems -  no   Plan of anesthesia - General    HPI:  This is a 64 y.o. male here for evaluation of : Hx of tubulovillous adenoma in 2020.     ROS:  Constitutional: No fevers, chills  CV: No chest pain  Pulm: No shortness of breath  GI: see HPI  Derm: No rash    Medical History:  has a past medical history of Hepatitis A, History of MRSA infection, HIV positive (2008), Hypercholesteremia (2011), Hyperglycemia, Hypertension, LFTs abnormal, Scoliosis, and Sleep apnea.    Surgical History:  has a past surgical history that includes Tonsillectomy (1964); Elbow fracture surgery (1968); Hemorrhoid surgery (N/A, 1980); Colonoscopy (08/2010); and Colonoscopy (N/A, 8/28/2020).    Family History: family history includes Cancer in his maternal grandfather and maternal grandmother; Hyperlipidemia in his brother; Osteoarthritis in his mother; Stroke (age of onset: 86) in his father.. Otherwise no colon cancer, inflammatory bowel disease, or GI malignancies.    Social History:  reports that he has never smoked. He has never used smokeless tobacco. He reports current alcohol use of about 15.0 standard drinks of alcohol per week. He reports that he does not use drugs.    Review of patient's allergies indicates:  No Known Allergies    Medications:   Medications Prior to Admission   Medication Sig Dispense Refill Last Dose    atazanavir (REYATAZ) 200 MG Cap Take 300 mg by mouth daily with breakfast.   8/22/2024    calcium-vitamin D3 (OS-ESTRELLA 500 + D3) 500 mg(1,250mg) -200 unit per tablet Take 1 tablet by mouth once daily.   8/22/2024    fish oil-omega-3 fatty acids 300-1,000 mg capsule Take 2 capsules by mouth once daily.   8/22/2024    lisinopriL 10 MG tablet Take 1 tablet (10 mg total) by mouth once  daily. 90 tablet 3 8/22/2024    rosuvastatin (CRESTOR) 10 MG tablet Take 1 tablet (10 mg total) by mouth once daily. 90 tablet 3 8/22/2024    djrjoqztc-xovjijcb-ndpskqi ala (BIKTARVY) -25 mg (25 kg or greater) Take 1 tablet by mouth once daily. 90 tablet 3 Unknown    HYDROcodone-acetaminophen (NORCO) 5-325 mg per tablet Take 1 tablet by mouth every 6 (six) hours as needed for Pain. 28 tablet 0 More than a month         Physical Exam:    Vital Signs: There were no vitals filed for this visit.    General Appearance: Well appearing in no acute distress  Eyes:    No scleral icterus  ENT: lips and tongue normal  Lungs: no use of accessory muscles  Heart:  normal rate, regular rhythm  Abdomen: Soft, non-tender, non distended   Extremities: no edema  Skin: No rash      Labs:  Lab Results   Component Value Date    WBC 5.99 03/19/2024    HGB 15.4 03/19/2024    HCT 47.4 03/19/2024     03/19/2024    CHOL 160 03/19/2024    TRIG 102 03/19/2024    HDL 52 03/19/2024    ALT 43 03/19/2024    AST 35 03/19/2024     03/19/2024    K 4.5 03/19/2024     03/19/2024    CREATININE 1.0 03/19/2024    BUN 14 03/19/2024    CO2 28 03/19/2024    PSA 2.0 03/19/2024    HGBA1C 6.3 (H) 03/19/2024       I have explained the risks and benefits of endoscopy procedures to the patient including but not limited to bleeding, perforation, infection, and death.  The patient was asked if they understand and allowed to ask any further questions to their satisfaction.    Nelson Kruger MD

## 2024-08-23 NOTE — ANESTHESIA POSTPROCEDURE EVALUATION
Anesthesia Post Evaluation    Patient: Griffin Valenzuela    Procedure(s) Performed: Procedure(s) (LRB):  COLONOSCOPY (N/A)    Final Anesthesia Type: general      Patient location during evaluation: GI PACU  Patient participation: Yes- Able to Participate  Level of consciousness: awake and alert and oriented  Post-procedure vital signs: reviewed and stable  Pain management: adequate  Airway patency: patent    PONV status at discharge: No PONV  Anesthetic complications: no      Cardiovascular status: stable  Respiratory status: unassisted, spontaneous ventilation and room air  Hydration status: euvolemic  Follow-up not needed.          Vitals Value Taken Time   /66 08/23/24 0828   Temp 36.3 °C (97.3 °F) 08/23/24 0828   Pulse 65 08/23/24 0828   Resp 18 08/23/24 0828   SpO2 94 % 08/23/24 0828         No case tracking events are documented in the log.      Pain/Kell Score: Kell Score: 8 (8/23/2024  8:28 AM)

## 2024-08-23 NOTE — PLAN OF CARE
Procedure completed. Pt discharged with friend. In no acute distress. Discharge instructions given. Verbalizes understanding of post procedure restrictions and instructions.

## 2024-08-23 NOTE — PLAN OF CARE
ID band verified. Fall risk band placed. Plan of care reviewed with patient. Patient verbalizes understanding and no questions at this time.

## 2024-08-26 LAB
FINAL PATHOLOGIC DIAGNOSIS: NORMAL
GROSS: NORMAL
Lab: NORMAL

## 2024-11-27 ENCOUNTER — PATIENT MESSAGE (OUTPATIENT)
Dept: ADMINISTRATIVE | Facility: OTHER | Age: 64
End: 2024-11-27
Payer: MEDICARE